# Patient Record
Sex: MALE | Race: WHITE | NOT HISPANIC OR LATINO | Employment: UNEMPLOYED | URBAN - NONMETROPOLITAN AREA
[De-identification: names, ages, dates, MRNs, and addresses within clinical notes are randomized per-mention and may not be internally consistent; named-entity substitution may affect disease eponyms.]

---

## 2023-10-22 PROBLEM — R06.00 DYSPNEA: Status: ACTIVE | Noted: 2023-10-22

## 2023-10-22 PROBLEM — J45.909 ASTHMA (HHS-HCC): Status: ACTIVE | Noted: 2023-10-22

## 2023-10-22 RX ORDER — ALBUTEROL SULFATE 0.83 MG/ML
SOLUTION RESPIRATORY (INHALATION)
COMMUNITY
End: 2024-01-31 | Stop reason: SDUPTHER

## 2023-10-22 RX ORDER — BUDESONIDE AND FORMOTEROL FUMARATE DIHYDRATE 160; 4.5 UG/1; UG/1
2 AEROSOL RESPIRATORY (INHALATION)
COMMUNITY

## 2023-10-24 ENCOUNTER — OFFICE VISIT (OUTPATIENT)
Dept: PULMONOLOGY | Facility: CLINIC | Age: 63
End: 2023-10-24
Payer: COMMERCIAL

## 2023-10-24 VITALS
HEART RATE: 67 BPM | DIASTOLIC BLOOD PRESSURE: 78 MMHG | OXYGEN SATURATION: 94 % | SYSTOLIC BLOOD PRESSURE: 130 MMHG | WEIGHT: 145 LBS | BODY MASS INDEX: 18.03 KG/M2 | HEIGHT: 75 IN

## 2023-10-24 DIAGNOSIS — R06.00 DYSPNEA, UNSPECIFIED TYPE: Primary | ICD-10-CM

## 2023-10-24 DIAGNOSIS — J45.909 ASTHMA, UNSPECIFIED ASTHMA SEVERITY, UNSPECIFIED WHETHER COMPLICATED, UNSPECIFIED WHETHER PERSISTENT (HHS-HCC): ICD-10-CM

## 2023-10-24 PROCEDURE — 99215 OFFICE O/P EST HI 40 MIN: CPT | Performed by: PEDIATRICS

## 2023-10-24 PROCEDURE — 1036F TOBACCO NON-USER: CPT | Performed by: PEDIATRICS

## 2023-10-24 NOTE — PROGRESS NOTES
Subjective   Patient ID: Pablito ROBB is a 63 y.o. male who presents for asthma SOB    HPI    Mr Robb is about the same. He had a PFT done which shows very severe obstruction with bornchodialtor response. He did not desaturate with walking.  He is using breo and needs albuterol 1-2 times a day.         previous note 11/16/2022: Mr Robb is here for asthma/emphysema. 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 The doctors refused to give him anti-helmenthic treatment so he uses homeopathic remedies and ivette healing which cured him of the parasites. The parasites ate holes in his stomach and lungs which is how he got emphysema.  He was previously on dulera and albuterol, the changed to breo and has been using trelegy since October. He feels the breo worked better for him with less side effects and would like to go back to that.    He never smoked     Review of Systems    See scanned documents attached to this note for review of systems, and appropriate scales/scores for this visit.     Objective   Physical Exam    Assessment/Plan     62yr old with asthma and emphysema (found on CXR)     1 asthma: feels he does bretter on symbicort as he can adjust the dose to his needs for any given day  - continue symbicort  - continue albuterol as needed  - discussed pulmonary rehab with him, he may be interested at some point but has decided for now to use prayer and deep knee bends to see if that works for him.  - I instructed him to call if he decides he would like a referral to pulmonary rehab  - given the severity of his lung function (FEV1 33% predicted) it seems he could qualify for disability.  I suggested he seek legal help to get this started.  I also provided a copy of his PFT to help with this.    2. ?parasitic infection in past: doubtful but he does have a mild eosinophilia  - nothing to do at this point  but if GI or pulmonary symptoms flair up could consider evaluation    f/u 6 months

## 2024-01-31 DIAGNOSIS — J45.909 ASTHMA, UNSPECIFIED ASTHMA SEVERITY, UNSPECIFIED WHETHER COMPLICATED, UNSPECIFIED WHETHER PERSISTENT (HHS-HCC): Primary | ICD-10-CM

## 2024-01-31 RX ORDER — ALBUTEROL SULFATE 0.83 MG/ML
2.5 SOLUTION RESPIRATORY (INHALATION) EVERY 6 HOURS PRN
Qty: 180 ML | Refills: 5 | Status: SHIPPED | OUTPATIENT
Start: 2024-01-31

## 2024-04-30 ENCOUNTER — TELEPHONE (OUTPATIENT)
Dept: PULMONOLOGY | Facility: CLINIC | Age: 64
End: 2024-04-30
Payer: COMMERCIAL

## 2024-04-30 NOTE — TELEPHONE ENCOUNTER
Patient is requesting a letter for jury duty for 5/9 same day he has an appt with you. I explained to patient we dont do letters for that but I would let you know and get back with him and probably have to r/s his appt.

## 2024-05-09 ENCOUNTER — APPOINTMENT (OUTPATIENT)
Dept: PULMONOLOGY | Facility: CLINIC | Age: 64
End: 2024-05-09
Payer: COMMERCIAL

## 2024-07-09 DIAGNOSIS — J45.909 ASTHMA, UNSPECIFIED ASTHMA SEVERITY, UNSPECIFIED WHETHER COMPLICATED, UNSPECIFIED WHETHER PERSISTENT (HHS-HCC): Primary | ICD-10-CM

## 2024-07-10 RX ORDER — BUDESONIDE AND FORMOTEROL FUMARATE DIHYDRATE 160; 4.5 UG/1; UG/1
2 AEROSOL RESPIRATORY (INHALATION) 2 TIMES DAILY
Qty: 10.2 G | Refills: 0 | Status: SHIPPED | OUTPATIENT
Start: 2024-07-10

## 2024-07-10 RX ORDER — ALBUTEROL SULFATE 90 UG/1
AEROSOL, METERED RESPIRATORY (INHALATION)
Qty: 18 G | Refills: 0 | Status: SHIPPED | OUTPATIENT
Start: 2024-07-10

## 2024-07-10 NOTE — TELEPHONE ENCOUNTER
Pt called and wanted to know if you could write an rx for proair for him he used to get this from Dr Dailey but pt no longer sees . Pt lost his prescription drug card since turning to medicare    Thank you!

## 2024-08-05 ENCOUNTER — APPOINTMENT (OUTPATIENT)
Dept: PULMONOLOGY | Facility: CLINIC | Age: 64
End: 2024-08-05
Payer: COMMERCIAL

## 2024-08-20 DIAGNOSIS — J45.909 ASTHMA, UNSPECIFIED ASTHMA SEVERITY, UNSPECIFIED WHETHER COMPLICATED, UNSPECIFIED WHETHER PERSISTENT (HHS-HCC): ICD-10-CM

## 2024-08-20 RX ORDER — BUDESONIDE AND FORMOTEROL FUMARATE DIHYDRATE 160; 4.5 UG/1; UG/1
2 AEROSOL RESPIRATORY (INHALATION) 2 TIMES DAILY
Qty: 10.2 G | Refills: 0 | Status: SHIPPED | OUTPATIENT
Start: 2024-08-20

## 2024-08-23 ENCOUNTER — TELEPHONE (OUTPATIENT)
Dept: PULMONOLOGY | Facility: CLINIC | Age: 64
End: 2024-08-23
Payer: COMMERCIAL

## 2024-08-23 NOTE — TELEPHONE ENCOUNTER
I called and spoke with the patient to let him know that his symbicort is not on his formulary for his insurance I gave him the website from [employer] to check to see if either breo or advair is covered under his insurance and to call us Monday morning to let us know what one is covered and then Dr MONTERROSO can send the over to his local pharmacy

## 2024-08-24 ENCOUNTER — HOSPITAL ENCOUNTER (EMERGENCY)
Facility: HOSPITAL | Age: 64
Discharge: HOME | End: 2024-08-24
Payer: COMMERCIAL

## 2024-08-24 VITALS
HEIGHT: 73 IN | RESPIRATION RATE: 18 BRPM | SYSTOLIC BLOOD PRESSURE: 134 MMHG | OXYGEN SATURATION: 93 % | WEIGHT: 140 LBS | BODY MASS INDEX: 18.55 KG/M2 | HEART RATE: 99 BPM | TEMPERATURE: 97.5 F | DIASTOLIC BLOOD PRESSURE: 101 MMHG

## 2024-08-24 DIAGNOSIS — J44.9 CHRONIC OBSTRUCTIVE PULMONARY DISEASE, UNSPECIFIED COPD TYPE (MULTI): Primary | ICD-10-CM

## 2024-08-24 PROCEDURE — 99281 EMR DPT VST MAYX REQ PHY/QHP: CPT

## 2024-08-24 RX ORDER — FLUTICASONE FUROATE AND VILANTEROL 200; 25 UG/1; UG/1
1 POWDER RESPIRATORY (INHALATION) ONCE
Status: DISCONTINUED | OUTPATIENT
Start: 2024-08-24 | End: 2024-08-24 | Stop reason: HOSPADM

## 2024-08-24 ASSESSMENT — COLUMBIA-SUICIDE SEVERITY RATING SCALE - C-SSRS
2. HAVE YOU ACTUALLY HAD ANY THOUGHTS OF KILLING YOURSELF?: NO
1. IN THE PAST MONTH, HAVE YOU WISHED YOU WERE DEAD OR WISHED YOU COULD GO TO SLEEP AND NOT WAKE UP?: NO
6. HAVE YOU EVER DONE ANYTHING, STARTED TO DO ANYTHING, OR PREPARED TO DO ANYTHING TO END YOUR LIFE?: NO

## 2024-08-24 ASSESSMENT — PAIN - FUNCTIONAL ASSESSMENT: PAIN_FUNCTIONAL_ASSESSMENT: 0-10

## 2024-08-24 ASSESSMENT — PAIN SCALES - GENERAL: PAINLEVEL_OUTOF10: 0 - NO PAIN

## 2024-08-25 NOTE — DISCHARGE INSTRUCTIONS
You have refused any work up or treatment in the ER,  heart related symptoms, heart attack, blood clot or pneumonia can not be excluded, if decided to have complete work up treatment return to the ER immediately

## 2024-08-25 NOTE — ED TRIAGE NOTES
Patient states he uses symbicort for his breathing and today was supposed to  a refill and they sent it to the wrong pharmacy and now he can't get it because the;y are closed. He is hoping we can give him some here.

## 2024-08-25 NOTE — ED PROVIDER NOTES
HPI   Chief Complaint   Patient presents with    Med Refill     Patient states he uses symbicort for his breathing and today was supposed to  a refill and they sent it to the wrong pharmacy and now he can't get it because the;y are closed. He is hoping we can give him some here.        HPI  [facility] pharmacy closed here to get symbicort on Saturday night   Patient says he was supposed to  his prescription from pharmacy however apparently the pharmacy was closed with 2 send denies and he is out of due to the weekend and he decided come to ER for Symbicort as his insurance from the ER.  He denies any chest pain abdominal pain vomiting or diarrhea he does not know any workup done he denies any dizziness lightheaded palpitation or difficulty breathing.  He want to prevent any problem of breathing before he has onset of symptoms and wants to be using Symbicort.  However currently denies any chest pain or short of breath any fever chills cough nausea vomiting diarrhea pain and swelling legs.        Family history: Reviewed   Social history: Reviewed, denies substance abuse.    Review of system: 10 review of system obtained review of system In HPI otherwise negative     Patient History   No past medical history on file.  No past surgical history on file.  No family history on file.  Social History     Tobacco Use    Smoking status: Never    Smokeless tobacco: Never   Substance Use Topics    Alcohol use: Not on file    Drug use: Not on file       Physical Exam   ED Triage Vitals [08/24/24 1957]   Temperature Heart Rate Respirations BP   36.4 °C (97.5 °F) 99 18 (!) 134/101      Pulse Ox Temp Source Heart Rate Source Patient Position   (!) 93 % Temporal Monitor Sitting      BP Location FiO2 (%)     Left arm --       Physical Exam  Constitutional:       Appearance: Normal appearance.      Comments: Patient was awake and alert pleasant cooperative well-developed manage did not look toxic or in distress.   Noted to have no tachypnea hypoxemia history of respiratory distress pulse ox 93% room air but he was not having any tachypnea or respiratory distress.  On auscultation he has minimal expiratory wheezing but moving air well bilaterally heart regular rate and rhythm lungs auscultated no drooling or stridor noted throat clear airway intact no angioedema.  No drooling or stridor.  Heart regular rate and rhythm lungs auscultated abdomen soft positive bowel sound nontender no guarding rebound or rigidity.  Calf is nontender Homans' sign negative intact distal pulse intact sensation no rashes no petechiae no ecchymosis neuro examination normal no motor or sensory deficit cranial nerves II through XII gross intact.  Patient walking with steady gait.   HENT:      Head: Normocephalic and atraumatic.      Right Ear: External ear normal.      Left Ear: External ear normal.      Nose: Nose normal. No congestion or rhinorrhea.   Eyes:      Extraocular Movements: Extraocular movements intact.      Conjunctiva/sclera: Conjunctivae normal.      Pupils: Pupils are equal, round, and reactive to light.   Cardiovascular:      Rate and Rhythm: Normal rate and regular rhythm.      Pulses: Normal pulses.      Heart sounds: Normal heart sounds.   Pulmonary:      Effort: Pulmonary effort is normal.      Breath sounds: Normal breath sounds.   Abdominal:      General: Abdomen is flat. Bowel sounds are normal.      Palpations: Abdomen is soft.   Musculoskeletal:      Cervical back: Normal range of motion and neck supple.   Skin:     General: Skin is warm.      Capillary Refill: Capillary refill takes less than 2 seconds.   Neurological:      General: No focal deficit present.      Mental Status: He is alert and oriented to person, place, and time.   Psychiatric:         Mood and Affect: Mood normal.         Behavior: Behavior normal.           ED Course & MDM   Diagnoses as of 09/07/24 0225   Chronic obstructive pulmonary disease, unspecified  COPD type (Multi)   OUT OF [facility] pharmacy closed here to get symbicort on Saturday night   Patient says he was supposed to  his prescription from pharmacy however apparently the pharmacy was closed with 2 send denies and he is out of due to the weekend and he decided come to ER for Symbicort as his insurance from the ER.  He denies any chest pain abdominal pain vomiting or diarrhea he does not know any workup done he denies any dizziness lightheaded palpitation or difficulty breathing.  He want to prevent any problem of breathing before he has onset of symptoms and wants to be using Symbicort.  However currently denies any chest pain or short of breath any fever chills cough nausea vomiting diarrhea pain and sw          Patient was awake and alert pleasant cooperative well-developed manage did not look toxic or in distress.  Noted to have no tachypnea hypoxemia history of respiratory distress pulse ox 93% room air but he was not having any tachypnea or respiratory distress.  On auscultation he has minimal expiratory wheezing but moving air well bilaterally heart regular rate and rhythm lungs auscultated no drooling or stridor noted throat clear airway intact no angioedema.  No drooling or stridor.  Heart regular rate and rhythm lungs auscultated abdomen soft positive bowel sound nontender no guarding rebound or rigidity.  Calf is nontender Homans' sign negative intact distal pulse intact sensation no rashes no petechiae no ecchymosis neuro examination normal no motor or sensory deficit cranial nerves II through XII gross intact.  Patient walking with steady gait.    No data recorded                                 Medical Decision Making  Patient did not want any workup done I did order DuoNeb aerosol treatment patient did not want any breathing treatment or workup he understood risk and benefit well he was dispensed with the Symbicort from respiratory therapist and as well as albuterol inhaler.  Use  albuterol and Symbicort in the ER prior to discharge and the remaining Symbicort and albuterol inhaler with him for use over the weekend until he get his pharmacy open get prescription filled.  If decided to be admitted worsening symptoms return to ER he understood risk and benefit well he was fully awake and alert and did not want any workup done in discharged with close follow-up.      procedure  Procedures     Kavitha Barber MD  08/24/24 2007       Kavitha Barber MD  09/07/24 0233       Kavitha Barber MD  09/07/24 0233

## 2024-10-07 ENCOUNTER — APPOINTMENT (OUTPATIENT)
Dept: PULMONOLOGY | Facility: CLINIC | Age: 64
End: 2024-10-07

## 2024-10-07 ENCOUNTER — APPOINTMENT (OUTPATIENT)
Dept: PULMONOLOGY | Facility: CLINIC | Age: 64
End: 2024-10-07
Payer: COMMERCIAL

## 2024-10-31 DIAGNOSIS — J45.909 ASTHMA, UNSPECIFIED ASTHMA SEVERITY, UNSPECIFIED WHETHER COMPLICATED, UNSPECIFIED WHETHER PERSISTENT (HHS-HCC): ICD-10-CM

## 2024-10-31 RX ORDER — ALBUTEROL SULFATE 0.83 MG/ML
2.5 SOLUTION RESPIRATORY (INHALATION) EVERY 6 HOURS PRN
Qty: 180 ML | Refills: 5 | Status: SHIPPED | OUTPATIENT
Start: 2024-10-31

## 2024-10-31 RX ORDER — ALBUTEROL SULFATE 90 UG/1
INHALANT RESPIRATORY (INHALATION)
Qty: 8.5 G | Refills: 0 | Status: SHIPPED | OUTPATIENT
Start: 2024-10-31

## 2024-11-07 ENCOUNTER — TELEPHONE (OUTPATIENT)
Dept: PULMONOLOGY | Facility: CLINIC | Age: 64
End: 2024-11-07
Payer: COMMERCIAL

## 2024-11-07 DIAGNOSIS — J45.909 ASTHMA, UNSPECIFIED ASTHMA SEVERITY, UNSPECIFIED WHETHER COMPLICATED, UNSPECIFIED WHETHER PERSISTENT (HHS-HCC): Primary | ICD-10-CM

## 2024-11-07 RX ORDER — FLUTICASONE PROPIONATE AND SALMETEROL 250; 50 UG/1; UG/1
1 POWDER RESPIRATORY (INHALATION)
Qty: 60 EACH | Refills: 11 | Status: SHIPPED | OUTPATIENT
Start: 2024-11-07

## 2024-11-07 NOTE — TELEPHONE ENCOUNTER
Pt called this morning and wants to switch from generic symbicort to advair he is wanting to know if this is okay. [facility]    Thank you!

## 2024-11-13 ENCOUNTER — APPOINTMENT (OUTPATIENT)
Dept: PULMONOLOGY | Facility: CLINIC | Age: 64
End: 2024-11-13
Payer: COMMERCIAL

## 2024-11-13 VITALS
HEART RATE: 101 BPM | WEIGHT: 138.8 LBS | SYSTOLIC BLOOD PRESSURE: 112 MMHG | OXYGEN SATURATION: 92 % | DIASTOLIC BLOOD PRESSURE: 75 MMHG | BODY MASS INDEX: 18.31 KG/M2

## 2024-11-13 DIAGNOSIS — J45.909 ASTHMA, UNSPECIFIED ASTHMA SEVERITY, UNSPECIFIED WHETHER COMPLICATED, UNSPECIFIED WHETHER PERSISTENT (HHS-HCC): Primary | ICD-10-CM

## 2024-11-13 DIAGNOSIS — J44.9 CHRONIC OBSTRUCTIVE PULMONARY DISEASE, UNSPECIFIED COPD TYPE (MULTI): ICD-10-CM

## 2024-11-13 PROCEDURE — 1036F TOBACCO NON-USER: CPT | Performed by: PEDIATRICS

## 2024-11-13 PROCEDURE — 1160F RVW MEDS BY RX/DR IN RCRD: CPT | Performed by: PEDIATRICS

## 2024-11-13 PROCEDURE — 1159F MED LIST DOCD IN RCRD: CPT | Performed by: PEDIATRICS

## 2024-11-13 PROCEDURE — 99215 OFFICE O/P EST HI 40 MIN: CPT | Performed by: PEDIATRICS

## 2024-11-13 NOTE — PROGRESS NOTES
Subjective   Patient ID: Pablito ROBB is a 64 y.o. male who presents for asthma/COPD    HPI    11/13/2024:  Mr Robb has had increased waxing/waning difficulty with his breathing which he attributes to having less spiritual strength than usual.  He is using advair currently.  He did not tolerate symbicort due to feeling calcium leaching out of his bones and dulera caused more of this.   He currently has no coverage for medications but that should change in January.    Previous note 10/24/2023:  Mr Robb is about the same. He had a PFT done which shows very severe obstruction with bornchodialtor response. He did not desaturate with walking.  He is using breo and needs albuterol 1-2 times a day.         previous note 11/16/2022: Mr Robb is here for asthma/emphysema. 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 The doctors refused to give him anti-helmenthic treatment so he uses homeopathic remedies and ivette healing which cured him of the parasites. The parasites ate holes in his stomach and lungs which is how he got emphysema.  He was previously on dulera and albuterol, the changed to breo and has been using trelegy since October. He feels the breo worked better for him with less side effects and would like to go back to that.     He never smoked        Review of Systems    See scanned documents attached to this note for review of systems, and appropriate scales/scores for this visit.     Objective   Physical Exam  Constitutional:       Appearance: Normal appearance.   HENT:      Head: Normocephalic and atraumatic.      Mouth/Throat:      Pharynx: Oropharynx is clear.   Cardiovascular:      Rate and Rhythm: Normal rate and regular rhythm.      Pulses: Normal pulses.      Heart sounds: Normal heart sounds.   Pulmonary:      Effort: Pulmonary effort is normal.      Breath sounds: No wheezing, rhonchi or  rales.      Comments: Scattered wheezing  Abdominal:      General: Bowel sounds are normal.      Palpations: Abdomen is soft.   Musculoskeletal:         General: Normal range of motion.   Skin:     General: Skin is warm and dry.   Neurological:      General: No focal deficit present.      Mental Status: He is alert and oriented to person, place, and time.   Psychiatric:         Mood and Affect: Mood normal.             Assessment/Plan        64yr old with asthma and emphysema (found on CXR)      1 asthma: feels he does bretter on symbicort as he can adjust the dose to his needs for any given day  - continue symbicort  - continue albuterol as needed  - discussed pulmonary rehab with him, he may be interested at some point but has decided for now to use prayer and deep knee bends to see if that works for him.  - I instructed him to call if he decides he would like a referral to pulmonary rehab  - given the severity of his lung function (FEV1 33% predicted) it seems he could qualify for disability.  I suggested he seek legal help to get this started.  I also provided a copy of his PFT to help with this.     11/13/2024:  continue advair and albuterol as needed    2. ?parasitic infection in past: doubtful but he does have a mild eosinophilia  - nothing to do at this point but if GI or pulmonary symptoms flair up could consider evaluation     Follow up 1 year    Serg Mathews MD 11/13/24 9:07 AM

## 2025-08-02 ENCOUNTER — APPOINTMENT (OUTPATIENT)
Dept: CARDIOLOGY | Facility: HOSPITAL | Age: 65
End: 2025-08-02
Payer: MEDICARE

## 2025-08-02 ENCOUNTER — HOSPITAL ENCOUNTER (EMERGENCY)
Facility: HOSPITAL | Age: 65
Discharge: HOME | End: 2025-08-02
Attending: EMERGENCY MEDICINE
Payer: MEDICARE

## 2025-08-02 ENCOUNTER — APPOINTMENT (OUTPATIENT)
Dept: RADIOLOGY | Facility: HOSPITAL | Age: 65
End: 2025-08-02
Payer: MEDICARE

## 2025-08-02 VITALS
HEART RATE: 88 BPM | OXYGEN SATURATION: 90 % | SYSTOLIC BLOOD PRESSURE: 127 MMHG | RESPIRATION RATE: 21 BRPM | BODY MASS INDEX: 18.64 KG/M2 | WEIGHT: 140.65 LBS | DIASTOLIC BLOOD PRESSURE: 88 MMHG | HEIGHT: 73 IN | TEMPERATURE: 97.8 F

## 2025-08-02 DIAGNOSIS — I47.10 SVT (SUPRAVENTRICULAR TACHYCARDIA): Primary | ICD-10-CM

## 2025-08-02 DIAGNOSIS — E87.6 HYPOKALEMIA: ICD-10-CM

## 2025-08-02 DIAGNOSIS — J44.1 COPD EXACERBATION (MULTI): ICD-10-CM

## 2025-08-02 LAB
ALBUMIN SERPL BCP-MCNC: 4.9 G/DL (ref 3.4–5)
ALP SERPL-CCNC: 94 U/L (ref 33–136)
ALT SERPL W P-5'-P-CCNC: 17 U/L (ref 10–52)
ANION GAP BLDA CALCULATED.4IONS-SCNC: 12 MMO/L (ref 10–25)
ANION GAP SERPL CALC-SCNC: 16 MMOL/L (ref 10–20)
ARTERIAL PATENCY WRIST A: POSITIVE
AST SERPL W P-5'-P-CCNC: 23 U/L (ref 9–39)
BASE EXCESS BLDA CALC-SCNC: -2.5 MMOL/L (ref -2–3)
BASOPHILS # BLD AUTO: 0.15 X10*3/UL (ref 0–0.1)
BASOPHILS NFR BLD AUTO: 1 %
BILIRUB SERPL-MCNC: 0.6 MG/DL (ref 0–1.2)
BNP SERPL-MCNC: 38 PG/ML (ref 0–99)
BODY TEMPERATURE: ABNORMAL
BUN SERPL-MCNC: 25 MG/DL (ref 6–23)
CA-I BLDA-SCNC: 1.12 MMOL/L (ref 1.1–1.33)
CALCIUM SERPL-MCNC: 9.2 MG/DL (ref 8.6–10.3)
CARDIAC TROPONIN I PNL SERPL HS: 12 NG/L (ref 0–20)
CARDIAC TROPONIN I PNL SERPL HS: 5 NG/L (ref 0–20)
CHLORIDE BLDA-SCNC: 106 MMOL/L (ref 98–107)
CHLORIDE SERPL-SCNC: 103 MMOL/L (ref 98–107)
CO2 SERPL-SCNC: 25 MMOL/L (ref 21–32)
CREAT SERPL-MCNC: 1.03 MG/DL (ref 0.5–1.3)
D DIMER PPP FEU-MCNC: <215 NG/ML FEU
EGFRCR SERPLBLD CKD-EPI 2021: 80 ML/MIN/1.73M*2
EOSINOPHIL # BLD AUTO: 1.22 X10*3/UL (ref 0–0.7)
EOSINOPHIL NFR BLD AUTO: 8.2 %
ERYTHROCYTE [DISTWIDTH] IN BLOOD BY AUTOMATED COUNT: 12.2 % (ref 11.5–14.5)
GLUCOSE BLDA-MCNC: 135 MG/DL (ref 74–99)
GLUCOSE SERPL-MCNC: 135 MG/DL (ref 74–99)
HCO3 BLDA-SCNC: 22.5 MMOL/L (ref 22–26)
HCT VFR BLD AUTO: 51.4 % (ref 41–52)
HCT VFR BLD EST: 48 % (ref 41–52)
HGB BLD-MCNC: 17.1 G/DL (ref 13.5–17.5)
HGB BLDA-MCNC: 15.9 G/DL (ref 13.5–17.5)
IMM GRANULOCYTES # BLD AUTO: 0.04 X10*3/UL (ref 0–0.7)
IMM GRANULOCYTES NFR BLD AUTO: 0.3 % (ref 0–0.9)
INHALED O2 CONCENTRATION: 44 %
INR PPP: 1.1 (ref 0.9–1.1)
LACTATE BLDA-SCNC: 1.4 MMOL/L (ref 0.4–2)
LACTATE SERPL-SCNC: 1.4 MMOL/L (ref 0.4–2)
LACTATE SERPL-SCNC: 5.4 MMOL/L (ref 0.4–2)
LYMPHOCYTES # BLD AUTO: 5.71 X10*3/UL (ref 1.2–4.8)
LYMPHOCYTES NFR BLD AUTO: 38.5 %
MAGNESIUM SERPL-MCNC: 2.39 MG/DL (ref 1.6–2.4)
MCH RBC QN AUTO: 31.5 PG (ref 26–34)
MCHC RBC AUTO-ENTMCNC: 33.3 G/DL (ref 32–36)
MCV RBC AUTO: 95 FL (ref 80–100)
MONOCYTES # BLD AUTO: 1.51 X10*3/UL (ref 0.1–1)
MONOCYTES NFR BLD AUTO: 10.2 %
NEUTROPHILS # BLD AUTO: 6.2 X10*3/UL (ref 1.2–7.7)
NEUTROPHILS NFR BLD AUTO: 41.8 %
NRBC BLD-RTO: 0 /100 WBCS (ref 0–0)
OXYHGB MFR BLDA: 94.5 % (ref 94–98)
PCO2 BLDA: 39 MM HG (ref 38–42)
PH BLDA: 7.37 PH (ref 7.38–7.42)
PLATELET # BLD AUTO: 352 X10*3/UL (ref 150–450)
PO2 BLDA: 78 MM HG (ref 85–95)
POTASSIUM BLDA-SCNC: 3.8 MMOL/L (ref 3.5–5.3)
POTASSIUM SERPL-SCNC: 3.3 MMOL/L (ref 3.5–5.3)
PROT SERPL-MCNC: 7.9 G/DL (ref 6.4–8.2)
PROTHROMBIN TIME: 11.9 SECONDS (ref 9.8–12.4)
RBC # BLD AUTO: 5.42 X10*6/UL (ref 4.5–5.9)
SAO2 % BLDA: 97 % (ref 94–100)
SODIUM BLDA-SCNC: 137 MMOL/L (ref 136–145)
SODIUM SERPL-SCNC: 141 MMOL/L (ref 136–145)
SPECIMEN DRAWN FROM PATIENT: ABNORMAL
WBC # BLD AUTO: 14.8 X10*3/UL (ref 4.4–11.3)

## 2025-08-02 PROCEDURE — 99291 CRITICAL CARE FIRST HOUR: CPT | Mod: 25 | Performed by: EMERGENCY MEDICINE

## 2025-08-02 PROCEDURE — 85610 PROTHROMBIN TIME: CPT | Performed by: EMERGENCY MEDICINE

## 2025-08-02 PROCEDURE — 2500000002 HC RX 250 W HCPCS SELF ADMINISTERED DRUGS (ALT 637 FOR MEDICARE OP, ALT 636 FOR OP/ED): Performed by: EMERGENCY MEDICINE

## 2025-08-02 PROCEDURE — 71045 X-RAY EXAM CHEST 1 VIEW: CPT

## 2025-08-02 PROCEDURE — 36415 COLL VENOUS BLD VENIPUNCTURE: CPT | Performed by: EMERGENCY MEDICINE

## 2025-08-02 PROCEDURE — 2500000002 HC RX 250 W HCPCS SELF ADMINISTERED DRUGS (ALT 637 FOR MEDICARE OP, ALT 636 FOR OP/ED)

## 2025-08-02 PROCEDURE — 96360 HYDRATION IV INFUSION INIT: CPT

## 2025-08-02 PROCEDURE — 71045 X-RAY EXAM CHEST 1 VIEW: CPT | Mod: FOREIGN READ | Performed by: RADIOLOGY

## 2025-08-02 PROCEDURE — 94640 AIRWAY INHALATION TREATMENT: CPT

## 2025-08-02 PROCEDURE — 93005 ELECTROCARDIOGRAM TRACING: CPT

## 2025-08-02 PROCEDURE — 85018 HEMOGLOBIN: CPT | Mod: 59 | Performed by: EMERGENCY MEDICINE

## 2025-08-02 PROCEDURE — 85025 COMPLETE CBC W/AUTO DIFF WBC: CPT | Performed by: EMERGENCY MEDICINE

## 2025-08-02 PROCEDURE — 85379 FIBRIN DEGRADATION QUANT: CPT | Performed by: EMERGENCY MEDICINE

## 2025-08-02 PROCEDURE — 96361 HYDRATE IV INFUSION ADD-ON: CPT

## 2025-08-02 PROCEDURE — 83735 ASSAY OF MAGNESIUM: CPT | Performed by: EMERGENCY MEDICINE

## 2025-08-02 PROCEDURE — 2500000004 HC RX 250 GENERAL PHARMACY W/ HCPCS (ALT 636 FOR OP/ED): Performed by: EMERGENCY MEDICINE

## 2025-08-02 PROCEDURE — 99284 EMERGENCY DEPT VISIT MOD MDM: CPT | Mod: 25

## 2025-08-02 PROCEDURE — 36600 WITHDRAWAL OF ARTERIAL BLOOD: CPT

## 2025-08-02 PROCEDURE — 2500000004 HC RX 250 GENERAL PHARMACY W/ HCPCS (ALT 636 FOR OP/ED)

## 2025-08-02 PROCEDURE — 84484 ASSAY OF TROPONIN QUANT: CPT | Performed by: EMERGENCY MEDICINE

## 2025-08-02 PROCEDURE — 83605 ASSAY OF LACTIC ACID: CPT | Performed by: EMERGENCY MEDICINE

## 2025-08-02 PROCEDURE — 84132 ASSAY OF SERUM POTASSIUM: CPT | Mod: 59 | Performed by: EMERGENCY MEDICINE

## 2025-08-02 PROCEDURE — 83880 ASSAY OF NATRIURETIC PEPTIDE: CPT | Performed by: EMERGENCY MEDICINE

## 2025-08-02 RX ORDER — PREDNISONE 20 MG/1
60 TABLET ORAL ONCE
Status: COMPLETED | OUTPATIENT
Start: 2025-08-02 | End: 2025-08-02

## 2025-08-02 RX ORDER — SODIUM CHLORIDE 9 MG/ML
125 INJECTION, SOLUTION INTRAVENOUS CONTINUOUS
Status: DISCONTINUED | OUTPATIENT
Start: 2025-08-02 | End: 2025-08-02 | Stop reason: HOSPADM

## 2025-08-02 RX ORDER — ADENOSINE 3 MG/ML
12 INJECTION, SOLUTION INTRAVENOUS ONCE
Status: COMPLETED | OUTPATIENT
Start: 2025-08-02 | End: 2025-08-02

## 2025-08-02 RX ORDER — PREDNISONE 20 MG/1
TABLET ORAL
Status: COMPLETED
Start: 2025-08-02 | End: 2025-08-02

## 2025-08-02 RX ORDER — IPRATROPIUM BROMIDE AND ALBUTEROL SULFATE 2.5; .5 MG/3ML; MG/3ML
3 SOLUTION RESPIRATORY (INHALATION) ONCE
Status: COMPLETED | OUTPATIENT
Start: 2025-08-02 | End: 2025-08-02

## 2025-08-02 RX ORDER — PREDNISONE 20 MG/1
40 TABLET ORAL DAILY
Qty: 10 TABLET | Refills: 0 | Status: SHIPPED | OUTPATIENT
Start: 2025-08-02 | End: 2025-08-07

## 2025-08-02 RX ORDER — ADENOSINE 3 MG/ML
6 INJECTION, SOLUTION INTRAVENOUS ONCE
Status: COMPLETED | OUTPATIENT
Start: 2025-08-02 | End: 2025-08-02

## 2025-08-02 RX ORDER — ADENOSINE 3 MG/ML
INJECTION, SOLUTION INTRAVENOUS
Status: COMPLETED
Start: 2025-08-02 | End: 2025-08-02

## 2025-08-02 RX ORDER — POTASSIUM CHLORIDE 20 MEQ/1
40 TABLET, EXTENDED RELEASE ORAL ONCE
Status: COMPLETED | OUTPATIENT
Start: 2025-08-02 | End: 2025-08-02

## 2025-08-02 RX ORDER — IPRATROPIUM BROMIDE AND ALBUTEROL SULFATE 2.5; .5 MG/3ML; MG/3ML
SOLUTION RESPIRATORY (INHALATION)
Status: COMPLETED
Start: 2025-08-02 | End: 2025-08-02

## 2025-08-02 RX ADMIN — ADENOSINE 6 MG: 3 INJECTION, SOLUTION INTRAVENOUS at 04:59

## 2025-08-02 RX ADMIN — IPRATROPIUM BROMIDE AND ALBUTEROL SULFATE 3 ML: .5; 3 SOLUTION RESPIRATORY (INHALATION) at 10:10

## 2025-08-02 RX ADMIN — IPRATROPIUM BROMIDE AND ALBUTEROL SULFATE 3 ML: 2.5; .5 SOLUTION RESPIRATORY (INHALATION) at 10:10

## 2025-08-02 RX ADMIN — SODIUM CHLORIDE 500 ML: 0.9 INJECTION, SOLUTION INTRAVENOUS at 05:00

## 2025-08-02 RX ADMIN — ADENOSINE 12 MG: 3 INJECTION, SOLUTION INTRAVENOUS at 05:02

## 2025-08-02 RX ADMIN — SODIUM CHLORIDE 125 ML/HR: 9 INJECTION, SOLUTION INTRAVENOUS at 05:15

## 2025-08-02 RX ADMIN — ADENOSINE 6 MG: 3 INJECTION INTRAVENOUS at 04:59

## 2025-08-02 RX ADMIN — PREDNISONE 60 MG: 20 TABLET ORAL at 09:59

## 2025-08-02 RX ADMIN — POTASSIUM CHLORIDE 40 MEQ: 1500 TABLET, EXTENDED RELEASE ORAL at 09:59

## 2025-08-02 ASSESSMENT — PAIN SCALES - GENERAL: PAINLEVEL_OUTOF10: 0 - NO PAIN

## 2025-08-02 ASSESSMENT — PAIN - FUNCTIONAL ASSESSMENT: PAIN_FUNCTIONAL_ASSESSMENT: 0-10

## 2025-08-02 NOTE — ED PROVIDER NOTES
Department of Emergency Medicine   ED  Provider Note  Admit Date/RoomTime: 8/2/2025  4:36 AM  ED Room: TR12/TR12                  History of Present Illness:   Pablito ROBB is a 65 y.o. male presenting to the ED for shortness of breath, beginning yesterday morning.  Patient states he noticed that his heart rate shot up around 3 AM.  EMS reported possible A-fib with RVR.  Patient reports he has lockjaw and since having locked jaw he has some difficulties.  He admits to shortness of breath.  He denies chest pain.  He admits to palpitations.  He is noted to be in SVT on arrival.  The complaint has been persistent, moderate in severity, and worsened by nothing.  Patient denies any fever or chills.  He denies significant cough.  He admits to history of asthma and COPD.      Review of Systems:   Pertinent positives and review of systems as noted above.  Remaining 10 review of systems is negative or noncontributory to today's episode of care.        --------------------------------------------- PAST HISTORY ---------------------------------------------  Past Medical History:  has no past medical history on file.  Bipolar affective disorder, adjustment disorder, asthma, COPD, TMJ, persecutory delusional disorder, PTSD    Past Surgical History:  has no past surgical history on file.    Social History:  reports that he has never smoked. He has never used smokeless tobacco. He reports that he does not use drugs.    Family History: family history is not on file. Unless otherwise noted, family history is non contributory    Patient's Medications   New Prescriptions    PREDNISONE (DELTASONE) 20 MG TABLET    Take 2 tablets (40 mg) by mouth once daily for 5 days.   Previous Medications    ALBUTEROL 2.5 MG /3 ML (0.083 %) NEBULIZER SOLUTION    Take 3 mL (2.5 mg) by nebulization every 6 hours if needed for wheezing. Use 3 mL via nebulizer every 6 hours as needed.    ALBUTEROL 90 MCG/ACTUATION INHALER    INHALE TWO PUFFS BY MOUTH AS  INSTRUCTED EVERY 6 HOURS AS NEEDED    FLUTICASONE PROPION-SALMETEROL (ADVAIR DISKUS) 250-50 MCG/DOSE DISKUS INHALER    Inhale 1 puff 2 times a day. Rinse mouth with water after use to reduce aftertaste and incidence of candidiasis. Do not swallow.   Modified Medications    No medications on file   Discontinued Medications    No medications on file      The patient’s home medications have been reviewed.    Allergies: Nsaids (non-steroidal anti-inflammatory drug)    -------------------------------------------------- RESULTS -------------------------------------------------  All laboratory and radiology results have been personally reviewed by myself   LABS:  Labs Reviewed   CBC WITH AUTO DIFFERENTIAL - Abnormal       Result Value    WBC 14.8 (*)     nRBC 0.0      RBC 5.42      Hemoglobin 17.1      Hematocrit 51.4      MCV 95      MCH 31.5      MCHC 33.3      RDW 12.2      Platelets 352      Neutrophils % 41.8      Immature Granulocytes %, Automated 0.3      Lymphocytes % 38.5      Monocytes % 10.2      Eosinophils % 8.2      Basophils % 1.0      Neutrophils Absolute 6.20      Immature Granulocytes Absolute, Automated 0.04      Lymphocytes Absolute 5.71 (*)     Monocytes Absolute 1.51 (*)     Eosinophils Absolute 1.22 (*)     Basophils Absolute 0.15 (*)    COMPREHENSIVE METABOLIC PANEL - Abnormal    Glucose 135 (*)     Sodium 141      Potassium 3.3 (*)     Chloride 103      Bicarbonate 25      Anion Gap 16      Urea Nitrogen 25 (*)     Creatinine 1.03      eGFR 80      Calcium 9.2      Albumin 4.9      Alkaline Phosphatase 94      Total Protein 7.9      AST 23      Bilirubin, Total 0.6      ALT 17     LACTATE - Abnormal    Lactate 5.4 (*)     Narrative:     Venipuncture immediately after or during the administration of Metamizole may lead to falsely low results. Testing should be performed immediately prior to Metamizole dosing.   BLOOD GAS ARTERIAL FULL PANEL - Abnormal    POCT pH, Arterial 7.37 (*)     POCT pCO2,  Arterial 39      POCT pO2, Arterial 78 (*)     POCT SO2, Arterial 97      POCT Oxy Hemoglobin, Arterial 94.5      POCT Hematocrit Calculated, Arterial 48.0      POCT Sodium, Arterial 137      POCT Potassium, Arterial 3.8      POCT Chloride, Arterial 106      POCT Ionized Calcium, Arterial 1.12      POCT Glucose, Arterial 135 (*)     POCT Lactate, Arterial 1.4      POCT Base Excess, Arterial -2.5 (*)     POCT HCO3 Calculated, Arterial 22.5      POCT Hemoglobin, Arterial 15.9      POCT Anion Gap, Arterial 12      Patient Temperature        FiO2 44      Site of Arterial Puncture Radial Right      David's Test Positive     MAGNESIUM - Normal    Magnesium 2.39     PROTIME-INR - Normal    Protime 11.9      INR 1.1     B-TYPE NATRIURETIC PEPTIDE - Normal    BNP 38      Narrative:        <100 pg/mL - Heart failure unlikely  100-299 pg/mL - Intermediate probability of acute heart                  failure exacerbation. Correlate with clinical                  context and patient history.    >=300 pg/mL - Heart Failure likely. Correlate with clinical                  context and patient history.    BNP testing is performed using different testing methodology at [facility] than at other [facility]. Direct result comparisons should only be made within the same method.      D-DIMER, VTE EXCLUSION - Normal    D-Dimer, Quantitative VTE Exclusion <215      Narrative:     The VTE Exclusion D-Dimer assay is reported in ng/mL Fibrinogen Equivalent Units (FEU).    Per 's instructions for use, a value of less than 500 ng/mL (FEU) may help to exclude DVT or PE in outpatients when the assay is used with a clinical pretest probability assessment.(AEMR must utilize and document eCalc 'Wells Score Deep Vein Thrombosis Risk' for DVT exclusion only. Emergency Department should utilize  Guidelines for Emergency Department Use of the VTE Exclusion D-Dimer and Clinical Pretest probability assessment model for DVT  or PE exclusion.)   SERIAL TROPONIN-INITIAL - Normal    Troponin I, High Sensitivity 5      Narrative:     Less than 99th percentile of normal range cutoff-  Female and children under 18 years old <14 ng/L; Male <21 ng/L: Negative  Repeat testing should be performed if clinically indicated.     Female and children under 18 years old 14-50 ng/L; Male 21-50 ng/L:  Consistent with possible cardiac damage and possible increased clinical   risk. Serial measurements may help to assess extent of myocardial damage.     >50 ng/L: Consistent with cardiac damage, increased clinical risk and  myocardial infarction. Serial measurements may help assess extent of   myocardial damage.      NOTE: Children less than 1 year old may have higher baseline troponin   levels and results should be interpreted in conjunction with the overall   clinical context.     NOTE: Troponin I testing is performed using a different   testing methodology at [facility] than at other   [facility]. Direct result comparisons should only   be made within the same method.   SERIAL TROPONIN, 1 HOUR - Normal    Troponin I, High Sensitivity 12      Narrative:     Less than 99th percentile of normal range cutoff-  Female and children under 18 years old <14 ng/L; Male <21 ng/L: Negative  Repeat testing should be performed if clinically indicated.     Female and children under 18 years old 14-50 ng/L; Male 21-50 ng/L:  Consistent with possible cardiac damage and possible increased clinical   risk. Serial measurements may help to assess extent of myocardial damage.     >50 ng/L: Consistent with cardiac damage, increased clinical risk and  myocardial infarction. Serial measurements may help assess extent of   myocardial damage.      NOTE: Children less than 1 year old may have higher baseline troponin   levels and results should be interpreted in conjunction with the overall   clinical context.     NOTE: Troponin I testing is performed using a  "different   testing methodology at [facility] than at other   [facility]. Direct result comparisons should only   be made within the same method.   LACTATE - Normal    Lactate 1.4      Narrative:     Venipuncture immediately after or during the administration of Metamizole may lead to falsely low results. Testing should be performed immediately prior to Metamizole dosing.   TROPONIN SERIES- (INITIAL, 1 HR)    Narrative:     The following orders were created for panel order Troponin I Series, High Sensitivity (0, 1 HR).  Procedure                               Abnormality         Status                     ---------                               -----------         ------                     Troponin I, High Sensiti...[[id number]]  Normal              Final result               Troponin, High Sensitivi...[[id number]]  Normal              Final result                 Please view results for these tests on the individual orders.         RADIOLOGY:  Interpreted by Radiologist.  XR chest 1 view   Final Result   No definite acute cardiopulmonary disease.  Chronic changes of COPD.   Signed by Angelo Williamson          No results found for this or any previous visit (from the past 4464 hours).  ------------------------- NURSING NOTES AND VITALS REVIEWED ---------------------------   The nursing notes within the ED encounter and vital signs as below have been reviewed.   /88   Pulse 84   Temp 35.9 °C (96.6 °F) (Temporal)   Resp 20   Ht 1.854 m (6' 1\")   Wt 63.8 kg (140 lb 10.5 oz)   SpO2 (!) 90%   BMI 18.56 kg/m²   Oxygen Saturation Interpretation: Normal      ---------------------------------------------------PHYSICAL EXAM--------------------------------------  Physical Exam  Vitals and nursing note reviewed.   Constitutional:       General: He is in acute distress.      Appearance: He is ill-appearing. He is not toxic-appearing or diaphoretic.      Interventions: He is not intubated.     " Comments: Anxious and short of breath   HENT:      Head: Normocephalic and atraumatic.      Mouth/Throat:      Mouth: Mucous membranes are moist.      Pharynx: Oropharynx is clear. No pharyngeal swelling or oropharyngeal exudate.     Eyes:      Extraocular Movements: Extraocular movements intact.      Pupils: Pupils are equal, round, and reactive to light.     Neck:      Thyroid: No thyromegaly.      Vascular: No hepatojugular reflux or JVD.      Trachea: No tracheal deviation.     Cardiovascular:      Rate and Rhythm: Tachycardia present.      Pulses: Normal pulses.      Heart sounds: Normal heart sounds. No murmur heard.     No friction rub. No gallop.   Pulmonary:      Effort: Tachypnea, accessory muscle usage and respiratory distress present. No bradypnea. He is not intubated.      Breath sounds: No stridor. Decreased breath sounds and wheezing present. No rhonchi or rales.      Comments: Patient with diminished breath sounds.  Positive accessory muscles of respiration use.  Chest:      Chest wall: No mass, deformity, tenderness, crepitus or edema. There is no dullness to percussion.   Abdominal:      Palpations: Abdomen is soft. There is no hepatomegaly, splenomegaly or mass.      Tenderness: There is no abdominal tenderness. There is no guarding or rebound.     Musculoskeletal:         General: Normal range of motion.      Cervical back: Normal range of motion and neck supple.      Right lower leg: No tenderness. No edema.      Left lower leg: No tenderness. No edema.   Lymphadenopathy:      Cervical: No cervical adenopathy.     Skin:     General: Skin is warm and dry.      Capillary Refill: Capillary refill takes less than 2 seconds.      Coloration: Skin is not cyanotic or pale.      Findings: No ecchymosis, erythema or rash.      Nails: There is no clubbing.     Neurological:      General: No focal deficit present.      Mental Status: He is alert and oriented to person, place, and time.      Cranial Nerves:  No cranial nerve deficit.      Motor: No weakness.     Psychiatric:         Mood and Affect: Mood is anxious.            Critical Care    Performed by: Otto Lopez DO  Authorized by: Otto Lopez DO    Critical care provider statement:     Critical care time (minutes):  42    Critical care time was exclusive of:  Separately billable procedures and treating other patients and teaching time    Critical care was necessary to treat or prevent imminent or life-threatening deterioration of the following conditions:  Respiratory failure (Cardiac arrhythmia: SVT)    Critical care was time spent personally by me on the following activities:  Blood draw for specimens, development of treatment plan with patient or surrogate, evaluation of patient's response to treatment, examination of patient, obtaining history from patient or surrogate, ordering and performing treatments and interventions, ordering and review of laboratory studies, ordering and review of radiographic studies, pulse oximetry, re-evaluation of patient's condition and review of old charts  Comments:      Discussed with patient.  Discussion and direction of nursing.  Given IV adenosine first 6 mg, then 12 mg IV push.  Evaluation of laboratory findings and treatment of abnormalities.        ------------------------------ ED COURSE/MEDICAL DECISION MAKING----------------------    Medical Decision Making:   Patient was seen and evaluated by me on arrival.  Patient was in SVT.  Short of breath.  Adenosine 6 mg IV push did not resolve the arrhythmia.  Adenosine 12 mg IV push did resolve the arrhythmia.  Patient remained in normal sinus rhythm thereafter.  Patient does seem to have a COPD flare.    ED Course as of 08/02/25 1014   Sat Aug 02, 2025   0508 EKG at 4:39 AM interpreted by me.  Supraventricular tachycardia rate 186 bpm.  Axis normal.  He has rightward axis.  RSR prime in lead V1 consistent with incomplete right bundle branch block.  QRS is 110 ms.    ms.  He has ST-T abnormality consistent with rate related ischemia. [EC]   0509 Patient was given IV adenosine 6 mg without conversion.    The patient was given a second dose of IV adenosine 12 mg IV push with success. [EC]   0510 Repeat  interpreted by me shows sinus tachycardia rate 114 bpm.  Axis normal.   ms QRS 80 ms  ms.  There is a lot of baseline artifact. [EC]   0510 This EKG was repeated at 5:02 AM.  Sinus tachycardia rate 115 bpm.  Axis normal.   ms QRS 84 ms  ms.  No acute ST segment elevation or depression there are some nonspecific ST-T change in baseline artifact.  No STEMI [EC]   0945 White blood cell count 14.8  Hemoglobin 17.1, hematocrit 51.4, platelet count 352 [EC]   0946 Comprehensive metabolic panel revealed potassium mildly low at 3.3, electrolytes otherwise normal.  Glucose 135.  BUN 25, creatinine 1.03  LFTs are normal [EC]   0946 D-dimer less than 215 [EC]   0946 First troponin 5  Second troponin 12  BNP 38  Lactate normal at 1.4  Magnesium normal at 2.39 [EC]   0947 Chest x-ray  IMPRESSION:  No definite acute cardiopulmonary disease.  Chronic changes of COPD.   [EC]   0956 A repeat EKG at 0738 interpreted by me at 0745.  Sinus tachycardia rate 104 bpm.  Axis normal.   ms,  ms,  ms.  RSR prime in lead V1 consistent with incomplete right bundle branch block.  Biatrial enlargement consistent with pulmonary pattern.  There is no acute ST-T change.  No STEMI. [EC]   0957 On reassessment the patient is resting comfortably.  We took him off his oxygen.  He is saturating 92% on room air.  Heart rate is 85.  Respiratory rate is 22 and unlabored.  Blood pressure 103/92.  The patient states he is feeling better.  The patient will be discharged home in improved and stable condition. [EC]   0958 The patient is asked to continue his regular COPD medications at home.  We will start him on prednisone 60 mg orally here x 1 then 40 mg once a day for  5 days.  Patient's potassium will be replaced with 40 mEq of potassium here as it was minimally low at 3.3. [EC]   1000 The patient is asked to return if acutely worsening worrisome symptoms.  He verbalizes understanding of instructions. [EC]      ED Course User Index  [EC] Otto Lopez DO         Diagnoses as of 08/02/25 1014   SVT (supraventricular tachycardia)   COPD exacerbation (Multi)   Hypokalemia      Counseling:   The emergency provider has spoken with the patient and discussed today’s results, in addition to providing specific details for the plan of care and counseling regarding the diagnosis and prognosis.  Questions are answered at this time and they are agreeable with the plan.      --------------------------------- IMPRESSION AND DISPOSITION ---------------------------------        IMPRESSION  1. SVT (supraventricular tachycardia)    2. COPD exacerbation (Multi)    3. Hypokalemia        DISPOSITION  Disposition: Discharge to home  Patient condition is stable      Billing Provider Critical Care Time: 42 minutes     Otto Lopez DO  08/02/25 1014

## 2025-08-02 NOTE — DISCHARGE INSTRUCTIONS
Continue your regular medications/inhalers/nebulizers as directed.  Take prednisone once a day:  next dose tomorrow.  RETURN if acutely worse or new worrisome symptoms.  FOLLOW UP with your regular doctor THIS WEEK.

## 2025-08-02 NOTE — ED TRIAGE NOTES
Pt BIBA for shortness ofbreath he had all day yesterday. Pt started on 4L o2. Pt also noticed his heart rate shoot up at 3am, pt in a fib rvr for the squad. Pt has a hx of afib rvr and is exacerbated by his issues with lockjaw. PT placed on monitor and EKG obtained.

## 2025-08-03 ENCOUNTER — APPOINTMENT (OUTPATIENT)
Dept: RADIOLOGY | Facility: HOSPITAL | Age: 65
DRG: 191 | End: 2025-08-03
Payer: MEDICARE

## 2025-08-03 ENCOUNTER — HOSPITAL ENCOUNTER (INPATIENT)
Facility: HOSPITAL | Age: 65
DRG: 191 | End: 2025-08-03
Attending: EMERGENCY MEDICINE | Admitting: INTERNAL MEDICINE
Payer: MEDICARE

## 2025-08-03 ENCOUNTER — APPOINTMENT (OUTPATIENT)
Dept: CARDIOLOGY | Facility: HOSPITAL | Age: 65
DRG: 191 | End: 2025-08-03
Payer: MEDICARE

## 2025-08-03 DIAGNOSIS — R06.02 SHORTNESS OF BREATH: ICD-10-CM

## 2025-08-03 DIAGNOSIS — J44.1 CHRONIC OBSTRUCTIVE PULMONARY DISEASE WITH ACUTE EXACERBATION (MULTI): Primary | ICD-10-CM

## 2025-08-03 DIAGNOSIS — R09.02 HYPOXIA: ICD-10-CM

## 2025-08-03 DIAGNOSIS — I47.10 PAROXYSMAL SVT (SUPRAVENTRICULAR TACHYCARDIA): ICD-10-CM

## 2025-08-03 LAB
ALBUMIN SERPL BCP-MCNC: 4.3 G/DL (ref 3.4–5)
ALP SERPL-CCNC: 73 U/L (ref 33–136)
ALT SERPL W P-5'-P-CCNC: 17 U/L (ref 10–52)
ANION GAP BLDV CALCULATED.4IONS-SCNC: 10 MMOL/L (ref 10–25)
ANION GAP SERPL CALC-SCNC: 14 MMOL/L (ref 10–20)
AST SERPL W P-5'-P-CCNC: 31 U/L (ref 9–39)
BASE EXCESS BLDV CALC-SCNC: 0.6 MMOL/L (ref -2–3)
BASOPHILS # BLD AUTO: 0.05 X10*3/UL (ref 0–0.1)
BASOPHILS NFR BLD AUTO: 0.4 %
BILIRUB SERPL-MCNC: 0.6 MG/DL (ref 0–1.2)
BNP SERPL-MCNC: 36 PG/ML (ref 0–99)
BODY TEMPERATURE: ABNORMAL
BUN SERPL-MCNC: 24 MG/DL (ref 6–23)
CA-I BLDV-SCNC: 1.19 MMOL/L (ref 1.1–1.33)
CALCIUM SERPL-MCNC: 8.5 MG/DL (ref 8.6–10.3)
CARDIAC TROPONIN I PNL SERPL HS: 5 NG/L (ref 0–20)
CARDIAC TROPONIN I PNL SERPL HS: 5 NG/L (ref 0–20)
CHLORIDE BLDV-SCNC: 105 MMOL/L (ref 98–107)
CHLORIDE SERPL-SCNC: 106 MMOL/L (ref 98–107)
CO2 SERPL-SCNC: 22 MMOL/L (ref 21–32)
CREAT SERPL-MCNC: 0.89 MG/DL (ref 0.5–1.3)
EGFRCR SERPLBLD CKD-EPI 2021: >90 ML/MIN/1.73M*2
EOSINOPHIL # BLD AUTO: 0.37 X10*3/UL (ref 0–0.7)
EOSINOPHIL NFR BLD AUTO: 3 %
ERYTHROCYTE [DISTWIDTH] IN BLOOD BY AUTOMATED COUNT: 12.3 % (ref 11.5–14.5)
GLUCOSE BLDV-MCNC: 127 MG/DL (ref 74–99)
GLUCOSE SERPL-MCNC: 118 MG/DL (ref 74–99)
HCO3 BLDV-SCNC: 27.1 MMOL/L (ref 22–26)
HCT VFR BLD AUTO: 46.9 % (ref 41–52)
HCT VFR BLD EST: 49 % (ref 41–52)
HGB BLD-MCNC: 15.5 G/DL (ref 13.5–17.5)
HGB BLDV-MCNC: 16.4 G/DL (ref 13.5–17.5)
IMM GRANULOCYTES # BLD AUTO: 0.05 X10*3/UL (ref 0–0.7)
IMM GRANULOCYTES NFR BLD AUTO: 0.4 % (ref 0–0.9)
INHALED O2 CONCENTRATION: 21 %
LACTATE BLDV-SCNC: 1.8 MMOL/L (ref 0.4–2)
LYMPHOCYTES # BLD AUTO: 3.96 X10*3/UL (ref 1.2–4.8)
LYMPHOCYTES NFR BLD AUTO: 31.7 %
MCH RBC QN AUTO: 30.9 PG (ref 26–34)
MCHC RBC AUTO-ENTMCNC: 33 G/DL (ref 32–36)
MCV RBC AUTO: 94 FL (ref 80–100)
MONOCYTES # BLD AUTO: 1.27 X10*3/UL (ref 0.1–1)
MONOCYTES NFR BLD AUTO: 10.2 %
NEUTROPHILS # BLD AUTO: 6.78 X10*3/UL (ref 1.2–7.7)
NEUTROPHILS NFR BLD AUTO: 54.3 %
NRBC BLD-RTO: 0 /100 WBCS (ref 0–0)
OXYHGB MFR BLDV: 60.7 % (ref 45–75)
PCO2 BLDV: 49 MM HG (ref 41–51)
PH BLDV: 7.35 PH (ref 7.33–7.43)
PLATELET # BLD AUTO: 272 X10*3/UL (ref 150–450)
PO2 BLDV: 35 MM HG (ref 35–45)
POTASSIUM BLDV-SCNC: 4.1 MMOL/L (ref 3.5–5.3)
POTASSIUM SERPL-SCNC: 5 MMOL/L (ref 3.5–5.3)
PROT SERPL-MCNC: 7.2 G/DL (ref 6.4–8.2)
RBC # BLD AUTO: 5.01 X10*6/UL (ref 4.5–5.9)
SAO2 % BLDV: 62 % (ref 45–75)
SODIUM BLDV-SCNC: 138 MMOL/L (ref 136–145)
SODIUM SERPL-SCNC: 137 MMOL/L (ref 136–145)
WBC # BLD AUTO: 12.5 X10*3/UL (ref 4.4–11.3)

## 2025-08-03 PROCEDURE — 1200000002 HC GENERAL ROOM WITH TELEMETRY DAILY: Mod: IPSPLIT

## 2025-08-03 PROCEDURE — 93010 ELECTROCARDIOGRAM REPORT: CPT | Performed by: INTERNAL MEDICINE

## 2025-08-03 PROCEDURE — 85025 COMPLETE CBC W/AUTO DIFF WBC: CPT | Performed by: EMERGENCY MEDICINE

## 2025-08-03 PROCEDURE — 99223 1ST HOSP IP/OBS HIGH 75: CPT | Performed by: NURSE PRACTITIONER

## 2025-08-03 PROCEDURE — 2500000004 HC RX 250 GENERAL PHARMACY W/ HCPCS (ALT 636 FOR OP/ED): Performed by: EMERGENCY MEDICINE

## 2025-08-03 PROCEDURE — 2500000004 HC RX 250 GENERAL PHARMACY W/ HCPCS (ALT 636 FOR OP/ED)

## 2025-08-03 PROCEDURE — 2500000004 HC RX 250 GENERAL PHARMACY W/ HCPCS (ALT 636 FOR OP/ED): Mod: JZ,TB,IPSPLIT

## 2025-08-03 PROCEDURE — 2500000005 HC RX 250 GENERAL PHARMACY W/O HCPCS: Performed by: EMERGENCY MEDICINE

## 2025-08-03 PROCEDURE — 2500000002 HC RX 250 W HCPCS SELF ADMINISTERED DRUGS (ALT 637 FOR MEDICARE OP, ALT 636 FOR OP/ED): Mod: IPSPLIT | Performed by: NURSE PRACTITIONER

## 2025-08-03 PROCEDURE — 83880 ASSAY OF NATRIURETIC PEPTIDE: CPT | Performed by: EMERGENCY MEDICINE

## 2025-08-03 PROCEDURE — 96361 HYDRATE IV INFUSION ADD-ON: CPT

## 2025-08-03 PROCEDURE — 84484 ASSAY OF TROPONIN QUANT: CPT | Performed by: EMERGENCY MEDICINE

## 2025-08-03 PROCEDURE — 94760 N-INVAS EAR/PLS OXIMETRY 1: CPT | Mod: IPSPLIT

## 2025-08-03 PROCEDURE — 2500000001 HC RX 250 WO HCPCS SELF ADMINISTERED DRUGS (ALT 637 FOR MEDICARE OP)

## 2025-08-03 PROCEDURE — 94640 AIRWAY INHALATION TREATMENT: CPT | Mod: IPSPLIT

## 2025-08-03 PROCEDURE — 36415 COLL VENOUS BLD VENIPUNCTURE: CPT | Performed by: EMERGENCY MEDICINE

## 2025-08-03 PROCEDURE — 84132 ASSAY OF SERUM POTASSIUM: CPT | Performed by: EMERGENCY MEDICINE

## 2025-08-03 PROCEDURE — 71046 X-RAY EXAM CHEST 2 VIEWS: CPT | Performed by: RADIOLOGY

## 2025-08-03 PROCEDURE — 96375 TX/PRO/DX INJ NEW DRUG ADDON: CPT

## 2025-08-03 PROCEDURE — 93005 ELECTROCARDIOGRAM TRACING: CPT

## 2025-08-03 PROCEDURE — 80053 COMPREHEN METABOLIC PANEL: CPT | Performed by: EMERGENCY MEDICINE

## 2025-08-03 PROCEDURE — 99291 CRITICAL CARE FIRST HOUR: CPT | Mod: 25 | Performed by: EMERGENCY MEDICINE

## 2025-08-03 PROCEDURE — 71046 X-RAY EXAM CHEST 2 VIEWS: CPT

## 2025-08-03 PROCEDURE — 2500000002 HC RX 250 W HCPCS SELF ADMINISTERED DRUGS (ALT 637 FOR MEDICARE OP, ALT 636 FOR OP/ED): Mod: IPSPLIT

## 2025-08-03 PROCEDURE — 96365 THER/PROPH/DIAG IV INF INIT: CPT

## 2025-08-03 RX ORDER — TALC
3 POWDER (GRAM) TOPICAL NIGHTLY PRN
Status: DISCONTINUED | OUTPATIENT
Start: 2025-08-03 | End: 2025-08-04 | Stop reason: HOSPADM

## 2025-08-03 RX ORDER — ACETAMINOPHEN 160 MG/5ML
650 SOLUTION ORAL EVERY 4 HOURS PRN
Status: DISCONTINUED | OUTPATIENT
Start: 2025-08-03 | End: 2025-08-03

## 2025-08-03 RX ORDER — ACETAMINOPHEN 325 MG/1
650 TABLET ORAL EVERY 4 HOURS PRN
Status: DISCONTINUED | OUTPATIENT
Start: 2025-08-03 | End: 2025-08-04 | Stop reason: HOSPADM

## 2025-08-03 RX ORDER — METOPROLOL TARTRATE 1 MG/ML
5 INJECTION, SOLUTION INTRAVENOUS
Status: DISCONTINUED | OUTPATIENT
Start: 2025-08-03 | End: 2025-08-03

## 2025-08-03 RX ORDER — POLYETHYLENE GLYCOL 3350 17 G/17G
17 POWDER, FOR SOLUTION ORAL DAILY PRN
Status: DISCONTINUED | OUTPATIENT
Start: 2025-08-03 | End: 2025-08-04 | Stop reason: HOSPADM

## 2025-08-03 RX ORDER — ENOXAPARIN SODIUM 100 MG/ML
40 INJECTION SUBCUTANEOUS EVERY 24 HOURS
Status: DISCONTINUED | OUTPATIENT
Start: 2025-08-03 | End: 2025-08-04

## 2025-08-03 RX ORDER — AZITHROMYCIN 250 MG/1
500 TABLET, FILM COATED ORAL DAILY
Status: DISCONTINUED | OUTPATIENT
Start: 2025-08-03 | End: 2025-08-04

## 2025-08-03 RX ORDER — ONDANSETRON HYDROCHLORIDE 2 MG/ML
4 INJECTION, SOLUTION INTRAVENOUS EVERY 8 HOURS PRN
Status: DISCONTINUED | OUTPATIENT
Start: 2025-08-03 | End: 2025-08-04 | Stop reason: HOSPADM

## 2025-08-03 RX ORDER — METOPROLOL TARTRATE 50 MG/1
TABLET ORAL
Status: COMPLETED
Start: 2025-08-03 | End: 2025-08-03

## 2025-08-03 RX ORDER — METOPROLOL TARTRATE 1 MG/ML
5 INJECTION, SOLUTION INTRAVENOUS ONCE
Status: COMPLETED | OUTPATIENT
Start: 2025-08-03 | End: 2025-08-03

## 2025-08-03 RX ORDER — METOPROLOL TARTRATE 50 MG/1
25 TABLET ORAL ONCE
Status: COMPLETED | OUTPATIENT
Start: 2025-08-03 | End: 2025-08-03

## 2025-08-03 RX ORDER — IPRATROPIUM BROMIDE AND ALBUTEROL SULFATE 2.5; .5 MG/3ML; MG/3ML
3 SOLUTION RESPIRATORY (INHALATION) 3 TIMES DAILY
Status: DISCONTINUED | OUTPATIENT
Start: 2025-08-03 | End: 2025-08-04 | Stop reason: HOSPADM

## 2025-08-03 RX ORDER — METOPROLOL TARTRATE 1 MG/ML
INJECTION, SOLUTION INTRAVENOUS
Status: COMPLETED
Start: 2025-08-03 | End: 2025-08-03

## 2025-08-03 RX ORDER — ONDANSETRON 4 MG/1
4 TABLET, FILM COATED ORAL EVERY 8 HOURS PRN
Status: DISCONTINUED | OUTPATIENT
Start: 2025-08-03 | End: 2025-08-04 | Stop reason: HOSPADM

## 2025-08-03 RX ORDER — IPRATROPIUM BROMIDE AND ALBUTEROL SULFATE 2.5; .5 MG/3ML; MG/3ML
3 SOLUTION RESPIRATORY (INHALATION)
Status: DISCONTINUED | OUTPATIENT
Start: 2025-08-03 | End: 2025-08-03

## 2025-08-03 RX ORDER — MAGNESIUM SULFATE HEPTAHYDRATE 40 MG/ML
2 INJECTION, SOLUTION INTRAVENOUS ONCE
Status: COMPLETED | OUTPATIENT
Start: 2025-08-03 | End: 2025-08-03

## 2025-08-03 RX ORDER — ACETAMINOPHEN 650 MG/1
650 SUPPOSITORY RECTAL EVERY 4 HOURS PRN
Status: DISCONTINUED | OUTPATIENT
Start: 2025-08-03 | End: 2025-08-03

## 2025-08-03 RX ORDER — IPRATROPIUM BROMIDE AND ALBUTEROL SULFATE 2.5; .5 MG/3ML; MG/3ML
SOLUTION RESPIRATORY (INHALATION)
Status: COMPLETED
Start: 2025-08-03 | End: 2025-08-03

## 2025-08-03 RX ORDER — ALBUTEROL SULFATE 0.83 MG/ML
2.5 SOLUTION RESPIRATORY (INHALATION) EVERY 6 HOURS PRN
Status: DISCONTINUED | OUTPATIENT
Start: 2025-08-03 | End: 2025-08-04 | Stop reason: HOSPADM

## 2025-08-03 RX ORDER — ENOXAPARIN SODIUM 100 MG/ML
40 INJECTION SUBCUTANEOUS EVERY 24 HOURS
Status: DISCONTINUED | OUTPATIENT
Start: 2025-08-03 | End: 2025-08-03 | Stop reason: SDUPTHER

## 2025-08-03 RX ADMIN — METOPROLOL TARTRATE 5 MG: 5 INJECTION INTRAVENOUS at 13:40

## 2025-08-03 RX ADMIN — MAGNESIUM SULFATE HEPTAHYDRATE 2 G: 40 INJECTION, SOLUTION INTRAVENOUS at 11:39

## 2025-08-03 RX ADMIN — Medication: at 21:41

## 2025-08-03 RX ADMIN — SODIUM CHLORIDE 1000 ML: 0.9 INJECTION, SOLUTION INTRAVENOUS at 11:19

## 2025-08-03 RX ADMIN — METOPROLOL TARTRATE 5 MG: 1 INJECTION, SOLUTION INTRAVENOUS at 13:40

## 2025-08-03 RX ADMIN — METHYLPREDNISOLONE SODIUM SUCCINATE 40 MG: 40 INJECTION, POWDER, FOR SOLUTION INTRAMUSCULAR; INTRAVENOUS at 16:30

## 2025-08-03 RX ADMIN — Medication: at 15:42

## 2025-08-03 RX ADMIN — METOPROLOL TARTRATE 25 MG: 50 TABLET ORAL at 13:40

## 2025-08-03 RX ADMIN — IPRATROPIUM BROMIDE AND ALBUTEROL SULFATE 3 ML: .5; 3 SOLUTION RESPIRATORY (INHALATION) at 21:41

## 2025-08-03 RX ADMIN — IPRATROPIUM BROMIDE AND ALBUTEROL SULFATE 3 ML: .5; 3 SOLUTION RESPIRATORY (INHALATION) at 15:38

## 2025-08-03 RX ADMIN — METOPROLOL TARTRATE 25 MG: 50 TABLET, FILM COATED ORAL at 13:40

## 2025-08-03 RX ADMIN — METHYLPREDNISOLONE SODIUM SUCCINATE 40 MG: 40 INJECTION, POWDER, FOR SOLUTION INTRAMUSCULAR; INTRAVENOUS at 22:03

## 2025-08-03 RX ADMIN — Medication 1 DOSE: at 11:43

## 2025-08-03 SDOH — SOCIAL STABILITY: SOCIAL INSECURITY: DO YOU FEEL ANYONE HAS EXPLOITED OR TAKEN ADVANTAGE OF YOU FINANCIALLY OR OF YOUR PERSONAL PROPERTY?: NO

## 2025-08-03 SDOH — SOCIAL STABILITY: SOCIAL INSECURITY: ARE YOU OR HAVE YOU BEEN THREATENED OR ABUSED PHYSICALLY, EMOTIONALLY, OR SEXUALLY BY ANYONE?: NO

## 2025-08-03 SDOH — SOCIAL STABILITY: SOCIAL INSECURITY: ABUSE: ADULT

## 2025-08-03 SDOH — SOCIAL STABILITY: SOCIAL INSECURITY: ARE THERE ANY APPARENT SIGNS OF INJURIES/BEHAVIORS THAT COULD BE RELATED TO ABUSE/NEGLECT?: NO

## 2025-08-03 SDOH — SOCIAL STABILITY: SOCIAL INSECURITY: HAVE YOU HAD THOUGHTS OF HARMING ANYONE ELSE?: YES

## 2025-08-03 SDOH — SOCIAL STABILITY: SOCIAL INSECURITY: DO YOU FEEL UNSAFE GOING BACK TO THE PLACE WHERE YOU ARE LIVING?: NO

## 2025-08-03 SDOH — ECONOMIC STABILITY: HOUSING INSECURITY: IN THE LAST 12 MONTHS, WAS THERE A TIME WHEN YOU WERE NOT ABLE TO PAY THE MORTGAGE OR RENT ON TIME?: NO

## 2025-08-03 SDOH — ECONOMIC STABILITY: FOOD INSECURITY: HOW HARD IS IT FOR YOU TO PAY FOR THE VERY BASICS LIKE FOOD, HOUSING, MEDICAL CARE, AND HEATING?: NOT HARD AT ALL

## 2025-08-03 SDOH — SOCIAL STABILITY: SOCIAL INSECURITY: WERE YOU ABLE TO COMPLETE ALL THE BEHAVIORAL HEALTH SCREENINGS?: YES

## 2025-08-03 SDOH — SOCIAL STABILITY: SOCIAL INSECURITY: HAS ANYONE EVER THREATENED TO HURT YOUR FAMILY OR YOUR PETS?: NO

## 2025-08-03 SDOH — SOCIAL STABILITY: SOCIAL INSECURITY: DOES ANYONE TRY TO KEEP YOU FROM HAVING/CONTACTING OTHER FRIENDS OR DOING THINGS OUTSIDE YOUR HOME?: NO

## 2025-08-03 ASSESSMENT — ACTIVITIES OF DAILY LIVING (ADL)
TOILETING: INDEPENDENT
HEARING - RIGHT EAR: FUNCTIONAL
JUDGMENT_ADEQUATE_SAFELY_COMPLETE_DAILY_ACTIVITIES: YES
LACK_OF_TRANSPORTATION: NO
FEEDING YOURSELF: INDEPENDENT
GROOMING: INDEPENDENT
DRESSING YOURSELF: INDEPENDENT
HEARING - LEFT EAR: FUNCTIONAL
WALKS IN HOME: INDEPENDENT
ADEQUATE_TO_COMPLETE_ADL: YES
PATIENT'S MEMORY ADEQUATE TO SAFELY COMPLETE DAILY ACTIVITIES?: YES
BATHING: INDEPENDENT

## 2025-08-03 ASSESSMENT — PATIENT HEALTH QUESTIONNAIRE - PHQ9
2. FEELING DOWN, DEPRESSED OR HOPELESS: NOT AT ALL
SUM OF ALL RESPONSES TO PHQ9 QUESTIONS 1 & 2: 0
1. LITTLE INTEREST OR PLEASURE IN DOING THINGS: NOT AT ALL

## 2025-08-03 ASSESSMENT — COGNITIVE AND FUNCTIONAL STATUS - GENERAL
PATIENT BASELINE BEDBOUND: NO
DAILY ACTIVITIY SCORE: 24
MOBILITY SCORE: 24

## 2025-08-03 ASSESSMENT — ENCOUNTER SYMPTOMS
SHORTNESS OF BREATH: 1
ACTIVITY CHANGE: 1
HEMATOLOGIC/LYMPHATIC NEGATIVE: 1
PSYCHIATRIC NEGATIVE: 1
CARDIOVASCULAR NEGATIVE: 1
GASTROINTESTINAL NEGATIVE: 1
MUSCULOSKELETAL NEGATIVE: 1
EYES NEGATIVE: 1
ENDOCRINE NEGATIVE: 1
NEUROLOGICAL NEGATIVE: 1
ALLERGIC/IMMUNOLOGIC NEGATIVE: 1
COUGH: 1

## 2025-08-03 ASSESSMENT — LIFESTYLE VARIABLES
HOW OFTEN DO YOU HAVE A DRINK CONTAINING ALCOHOL: NEVER
AUDIT-C TOTAL SCORE: 0
AUDIT-C TOTAL SCORE: 0
SKIP TO QUESTIONS 9-10: 1
HOW OFTEN DO YOU HAVE 6 OR MORE DRINKS ON ONE OCCASION: NEVER
HOW MANY STANDARD DRINKS CONTAINING ALCOHOL DO YOU HAVE ON A TYPICAL DAY: PATIENT DOES NOT DRINK

## 2025-08-03 ASSESSMENT — PAIN SCALES - GENERAL
PAINLEVEL_OUTOF10: 0 - NO PAIN
PAINLEVEL_OUTOF10: 0 - NO PAIN

## 2025-08-03 ASSESSMENT — PAIN - FUNCTIONAL ASSESSMENT: PAIN_FUNCTIONAL_ASSESSMENT: 0-10

## 2025-08-03 NOTE — ED TRIAGE NOTES
Pt states having hx COPD and emphysema, was seen in the ED yesterday and discharged home. Pt states this morning he started to feel SOB again when he woke up. Pt received 2 duonebs and 125mg IV solumedrol enroute

## 2025-08-03 NOTE — CARE PLAN
"The patient's goals for the shift include      The clinical goals for the shift include Pt will have a spo2 above 95% on 2L NC    Since arriving to the floor, patient has been calm and cooperative. He remains on 2L o2. Tolerated solumedrol and breathing treatments without adverse effects. Pt refuses any antibiotics as he states \"The spirits and God will heal him.\" NP aware.         "

## 2025-08-03 NOTE — H&P
History Of Present Illness  Pablito ROBB is a 65 y.o. male with past medical history of asthma, COPD, bipolar affective disorder, adjustment disorder, delusional disorder, PTSD who presented to the emergency department today with shortness of breath, cough and wheezing.  He states he was seen in the emergency department yesterday for SVT and COPD exacerbation and was discharged home.  He states since he was discharged home yesterday he has felt short of breath with exertion and improved with rest but then this morning he woke up and felt more short of breath.  On arrival to the ED he was given DuoNebs x 2 as well as IV Solu-Medrol and IV magnesium.  He required oxygen supplementation 2 L nasal cannula when he became mildly hypoxic. CT was ordered but patient refused it.  During his ED workup he experienced SVT with heart rates in the 180s on the monitor.  Before intervention could be carried out he converted back to sinus rhythm.  He was then given oral and IV metoprolol.  Lab workup showed mild leukocytosis, VBG unremarkable.  He was subsequently admitted to Medicine for further treatment and evaluation and close monitoring of hemodynamic status.     VS: T36.6, , respirations 24, /103, SpO2 93% on room air  EKG: Sinus tachycardia, rate 105, repeat  Significant labs: WBC 12.5,  Diagnostics: Chest x-ray: bronchial wall thickening suggestive of reactive airway disease, no consolidation     Past Medical History  Past Medical History:   Diagnosis Date    Adjustment disorder     Asthma     Bipolar affective (Multi)     COPD (chronic obstructive pulmonary disease) (Multi)     Delusional disorder, persecutory type (Multi)     Inguinal hernia     Pseudophakia of both eyes     PTSD (post-traumatic stress disorder)     TMJ (dislocation of temporomandibular joint)      Surgical History  Past Surgical History:   Procedure Laterality Date    CATARACT EXTRACTION Bilateral     HERNIA REPAIR      SINUS SURGERY         Social History  He reports that he has never smoked. He has never used smokeless tobacco. He reports that he does not use drugs. No history on file for alcohol use.    Family History  Family History[1]     Allergies  Nsaids (non-steroidal anti-inflammatory drug)    Review of Systems   Constitutional:  Positive for activity change.   HENT: Negative.     Eyes: Negative.    Respiratory:  Positive for cough and shortness of breath.    Cardiovascular: Negative.    Gastrointestinal: Negative.    Endocrine: Negative.    Genitourinary: Negative.    Musculoskeletal: Negative.    Allergic/Immunologic: Negative.    Neurological: Negative.    Hematological: Negative.    Psychiatric/Behavioral: Negative.          Physical Exam  Constitutional:       General: He is not in acute distress.     Appearance: Normal appearance. He is not toxic-appearing.   HENT:      Head: Normocephalic and atraumatic.      Mouth/Throat:      Mouth: Mucous membranes are moist.     Eyes:      Extraocular Movements: Extraocular movements intact.      Pupils: Pupils are equal, round, and reactive to light.       Cardiovascular:      Rate and Rhythm: Normal rate and regular rhythm.      Pulses: Normal pulses.      Heart sounds: Normal heart sounds. No murmur heard.     No gallop.   Pulmonary:      Effort: Pulmonary effort is normal. No respiratory distress.      Breath sounds: Wheezing present. No rhonchi or rales.   Abdominal:      General: Bowel sounds are normal. There is no distension.      Palpations: Abdomen is soft.      Tenderness: There is no abdominal tenderness. There is no guarding or rebound.     Musculoskeletal:         General: No swelling, tenderness, deformity or signs of injury. Normal range of motion.      Cervical back: Normal range of motion and neck supple.     Skin:     General: Skin is warm and dry.      Capillary Refill: Capillary refill takes less than 2 seconds.      Coloration: Skin is not jaundiced.      Findings: No bruising  "or rash.     Neurological:      General: No focal deficit present.      Mental Status: He is alert and oriented to person, place, and time. Mental status is at baseline.      Cranial Nerves: No cranial nerve deficit.      Sensory: No sensory deficit.      Motor: No weakness.      Gait: Gait normal.     Psychiatric:         Mood and Affect: Mood normal.         Behavior: Behavior normal.         Thought Content: Thought content normal.         Judgment: Judgment normal.          Last Recorded Vitals  Blood pressure (!) 140/91, pulse 87, temperature 36.6 °C (97.8 °F), resp. rate 16, height 1.854 m (6' 1\"), weight 65.2 kg (143 lb 11.8 oz), SpO2 98%.    Relevant Results  XR chest 2 views  Result Date: 8/3/2025  1. Mild diffuse increase in central perihilar markings and bronchial wall thickening suggestive of viral or reactive airways disease. No consolidations.     Signed by: Tariq Henry 8/3/2025 12:40 PM Dictation workstation:   [id number]    XR chest 1 view  Result Date: 8/2/2025  No definite acute cardiopulmonary disease.  Chronic changes of COPD. Signed by Angelo Williamson     Latest Reference Range & Units 08/03/25 11:04   GLUCOSE 74 - 99 mg/dL 118 (H)   SODIUM 136 - 145 mmol/L 137   POTASSIUM 3.5 - 5.3 mmol/L 5.0   CHLORIDE 98 - 107 mmol/L 106   Bicarbonate 21 - 32 mmol/L 22   Anion Gap 10 - 20 mmol/L 14   Blood Urea Nitrogen 6 - 23 mg/dL 24 (H)   Creatinine 0.50 - 1.30 mg/dL 0.89   EGFR >60 mL/min/1.73m*2 >90   Calcium 8.6 - 10.3 mg/dL 8.5 (L)   Albumin 3.4 - 5.0 g/dL 4.3   Alkaline Phosphatase 33 - 136 U/L 73   ALT 10 - 52 U/L 17   AST 9 - 39 U/L 31   Bilirubin Total 0.0 - 1.2 mg/dL 0.6   Total Protein 6.4 - 8.2 g/dL 7.2   BNP 0 - 99 pg/mL 36   Troponin I, High Sensitivity 0 - 20 ng/L 5   WBC 4.4 - 11.3 x10*3/uL 12.5 (H)   nRBC 0.0 - 0.0 /100 WBCs 0.0   RBC 4.50 - 5.90 x10*6/uL 5.01   HEMOGLOBIN 13.5 - 17.5 g/dL 15.5   HEMATOCRIT 41.0 - 52.0 % 46.9   MCV 80 - 100 fL 94   MCH 26.0 - 34.0 pg 30.9   MCHC 32.0 - 36.0 " g/dL 33.0   RED CELL DISTRIBUTION WIDTH 11.5 - 14.5 % 12.3   Platelets 150 - 450 x10*3/uL 272     Assessment & Plan  Chronic obstructive pulmonary disease with acute exacerbation (Multi)    SVT (supraventricular tachycardia)      Chronic obstructive pulmonary disease with acute exacerbation (Multi)  - Received IV magnesium, bronchodilators in ED  - WBC:12.5  - AB.35/49/35/27.1  - CXR: 1. Mild diffuse increase in central perihilar markings and bronchial wall thickening suggestive of viral or reactive airways disease. No consolidations.  - Currently on 2 liters  - RT consult, keep sats >90%  - wean as tolerated  - continue nebulized treatments  - continue steroids  - Antibiotics: refused  - bronchial hygiene    SVT (supraventricular tachycardia)  -hx of svt in the past  -converted back to SR without intervention  -telemetry  -IV metoprolol for HR>140  -Cardiology consult if needed    GI ppx: PPI  DVT ppx: enoxaparin  Fluids: prn  Electrolytes: replace as needed  Nutrition: reg  Adjuncts: PIV  Code Status: full  Pt requires inpatient stay at this time.    Angelina King, APRN-CNP         [1]   Family History  Problem Relation Name Age of Onset    Glaucoma Mother        department again.  On arrival to the emergency room he was given DuoNeb x 2 as well as IV Solu-Medrol and IV magnesium.  He was requiring 2 L of supplemental nasal cannula oxygen because of hypoxia.  CT scan was ordered but patient refused stated.  During his emergency department workup he was experiencing SVT with heart rate in the range of 180s.  It was converted back to normal rhythm before the intervention     Vital signs on arrival were temperature of 36.6, heart rate 105, respiration 24, blood pressure 152/103 and oxygen saturation of 93% on room air.  Diagnostics-chest x-ray is suggestive of bronchial wall thickening possibly reactive airway disease with no consolidation  Upon auscultation patient has diffuse bilateral expiratory wheezing.    He has been admitted to the hospital for COPD exacerbation and supraventricular tachycardia    -Patient has been placed on nebulizer, IV steroid.  He refused antibiotics.  - Cardiology has been consulted for SVT and he is getting IV metoprolol for the heart rate greater than 114 otherwise oral metoprolol.    DVT prophylaxis-enoxaparin    Dusty Chinchilla MD  Internal Medicine.       [1]   Family History  Problem Relation Name Age of Onset    Glaucoma Mother

## 2025-08-03 NOTE — SIGNIFICANT EVENT
Rt called bedside for patient with SOB. Patient 93% on RA. Patient states he took 1 albuterol tx at home. He received 2x duoneb in squad with steroids. Pt is diminished with expiratory wheezes. Patient being placed on cardiac monitoring at that time. Rt spoke with RN to call RT once the DR has accessed patient if he would like anymore breathing txs etc.

## 2025-08-03 NOTE — ED PROVIDER NOTES
HPI   Chief Complaint   Patient presents with    Shortness of Breath     Pt states having hx COPD and emphysema, was seen in the ED yesterday and discharged home. Pt states this morning he started to feel SOB again when he woke up. Pt received 2 duonebs and 125mg IV solumedrol enroute       65-year-old male with history of COPD, SVT, hyperlipidemia, TMJ dysfunction, inguinal hernias, bipolar disorder, per  he delusional disorder, PTSD, and reactive depression presents for evaluation of shortness of breath cough wheezing.  Patient was seen yesterday in this ED for SVT and COPD and discharged home.  He states since going home he has had shortness of breath with exertion that improves some with rest.  He called EMS this morning and was noted to be wheezing and short of breath.  He was given DuoNeb x 2 and Solu-Medrol 125 mg IV by EMS.  On arrival, patient has ongoing wheezing but states that shortness of breath is improving.  He states previously had been prescribed oxygen, but has no oxygen at home.      History provided by:  Patient, medical records and EMS personnel    Patient History   History reviewed. No pertinent past medical history.  Surgical History[1]  Family History[2]  Social History[3]    Physical Exam   ED Triage Vitals [08/03/25 1053]   Temperature Heart Rate Respirations BP   36.6 °C (97.8 °F) (!) 105 (!) 24 (!) 152/103      Pulse Ox Temp Source Heart Rate Source Patient Position   (!) 93 % Temporal -- --      BP Location FiO2 (%)     -- --       Physical Exam  Vitals and nursing note reviewed.   Constitutional:       Appearance: Normal appearance.   HENT:      Head: Normocephalic and atraumatic.      Right Ear: External ear normal.      Left Ear: External ear normal.      Nose: Nose normal.     Eyes:      Extraocular Movements: Extraocular movements intact.      Pupils: Pupils are equal, round, and reactive to light.       Cardiovascular:      Rate and Rhythm: Normal rate and regular rhythm.  "  Pulmonary:      Effort: Pulmonary effort is normal.      Breath sounds: Wheezing present.   Abdominal:      Palpations: Abdomen is soft.      Tenderness: There is no abdominal tenderness.     Musculoskeletal:         General: No deformity. Normal range of motion.      Cervical back: Normal range of motion.     Skin:     General: Skin is warm and dry.     Neurological:      General: No focal deficit present.      Mental Status: He is alert and oriented to person, place, and time.      Cranial Nerves: No cranial nerve deficit.      Sensory: No sensory deficit.      Motor: No weakness.     Psychiatric:         Mood and Affect: Mood normal.           ED Course & MDM   ED Course as of 08/03/25 1345   Sun Aug 03, 2025   1130 65-year-old male presents for evaluation of shortness of breath cough wheezing and hypoxia.  His oxygen saturation dropped to 88% on room air.  He was placed on 2 L nasal cannula oxygen with resolution of hypoxia.  He received 2 DuoNebs and Solu-Medrol prior to arrival.  He has residual wheezing without respiratory distress.  I ordered saline bolus, magnesium, oxygen.  Labs including serial troponins, BNP.  CT chest PE study ordered to evaluate for pulmonary embolism or consolidation.  Patient refused CT chest because \"I know I do not have a blood clot my lungs.\"  He did have a negative D-dimer on 8/2.  He is agreeable to chest x-ray and labs. [BT]   1139 ECG 12 lead  EKG interpreted by me performed at 10:53 AM on 8/3/2025 shows sinus tachycardia with rate of 105.  PVCs.  Normal axis.  No acute injury pattern. [BT]   1338 Patient went into SVT with heart rates in the 180s on the monitor.  He converted back to sinus rhythm without intervention.  Lopressor 5 mg IV and metoprolol 25 mg p.o. have been ordered. [BT]   1341 CBC with white count 12.5.  Venous blood gas unremarkable.  Chemistry with BUN 24 and calcium 8.5.  Otherwise, chemistry unremarkable.  Troponin 5.  EKG shows sinus tachycardia with no " acute injury pattern.  ACS doubtful.  Chest x-ray shows COPD.  No infiltrates.  He has remained hemodynamically stable in the emergency department.  Given new oxygen requirement, he will be hospitalized.  I spoke with medicine hospitalist MERYL Soriano who accepted patient for admission. [BT]   1344 Inpatient team updated on episode of SVT.  He will be admitted to the ICU for close monitoring. [BT]   1344 Electrocardiogram, 12-lead PRN ACS symptoms  Repeat EKG interpreted by me performed at 1334 on 8/3/2025 shows sinus rhythm with biatrial enlargement.  Rate = 81.  Right axis.  Pulmonary disease pattern.  No acute injury pattern. [BT]      ED Course User Index  [BT] Juancho Ozuna DO         Diagnoses as of 08/03/25 1345   Shortness of breath   Chronic obstructive pulmonary disease with acute exacerbation (Multi)   Hypoxia   Paroxysmal SVT (supraventricular tachycardia)     XR chest 2 views   Final Result   1. Mild diffuse increase in central perihilar markings and bronchial   wall thickening suggestive of viral or reactive airways disease. No   consolidations.             Signed by: Tariq Henry 8/3/2025 12:40 PM   Dictation workstation:   [id number]                      No data recorded                                 Medical Decision Making      Procedure  Critical Care    Performed by: Juancho Ozuna DO  Authorized by: Juancho Ozuna DO    Critical care provider statement:     Critical care time (minutes):  35    Critical care time was exclusive of:  Separately billable procedures and treating other patients and teaching time    Critical care was necessary to treat or prevent imminent or life-threatening deterioration of the following conditions:  Other (use comment box to enter another option) (Hypoxia requiring oxygen)    Critical care was time spent personally by me on the following activities:  Development of treatment plan with patient or surrogate, ordering and review of laboratory studies,  ordering and review of radiographic studies, pulse oximetry, re-evaluation of patient's condition, review of old charts, examination of patient, obtaining history from patient or surrogate and evaluation of patient's response to treatment    Care discussed with: admitting provider           [1] History reviewed. No pertinent surgical history.  [2] No family history on file.  [3]   Social History  Tobacco Use    Smoking status: Never    Smokeless tobacco: Never   Vaping Use    Vaping status: Never Used   Substance Use Topics    Alcohol use: Not on file    Drug use: Never        Juancho Ozuna,   08/03/25 [id number]

## 2025-08-04 ENCOUNTER — HOSPITAL ENCOUNTER (INPATIENT)
Dept: CARDIOLOGY | Facility: HOSPITAL | Age: 65
Discharge: HOME | DRG: 191 | End: 2025-08-04
Payer: MEDICARE

## 2025-08-04 VITALS
HEIGHT: 73 IN | RESPIRATION RATE: 27 BRPM | OXYGEN SATURATION: 96 % | HEART RATE: 100 BPM | BODY MASS INDEX: 18.99 KG/M2 | WEIGHT: 143.3 LBS | DIASTOLIC BLOOD PRESSURE: 66 MMHG | TEMPERATURE: 96.6 F | SYSTOLIC BLOOD PRESSURE: 109 MMHG

## 2025-08-04 VITALS
HEART RATE: 89 BPM | HEIGHT: 73 IN | SYSTOLIC BLOOD PRESSURE: 108 MMHG | TEMPERATURE: 98.1 F | BODY MASS INDEX: 19.02 KG/M2 | OXYGEN SATURATION: 94 % | RESPIRATION RATE: 14 BRPM | WEIGHT: 143.52 LBS | DIASTOLIC BLOOD PRESSURE: 73 MMHG

## 2025-08-04 LAB
ANION GAP SERPL CALC-SCNC: 11 MMOL/L (ref 10–20)
BUN SERPL-MCNC: 21 MG/DL (ref 6–23)
CALCIUM SERPL-MCNC: 8.4 MG/DL (ref 8.6–10.3)
CHLORIDE SERPL-SCNC: 108 MMOL/L (ref 98–107)
CO2 SERPL-SCNC: 25 MMOL/L (ref 21–32)
CREAT SERPL-MCNC: 0.79 MG/DL (ref 0.5–1.3)
D DIMER PPP FEU-MCNC: 346 NG/ML FEU
EGFRCR SERPLBLD CKD-EPI 2021: >90 ML/MIN/1.73M*2
ERYTHROCYTE [DISTWIDTH] IN BLOOD BY AUTOMATED COUNT: 12.4 % (ref 11.5–14.5)
GLUCOSE SERPL-MCNC: 137 MG/DL (ref 74–99)
HCT VFR BLD AUTO: 42.8 % (ref 41–52)
HGB BLD-MCNC: 14 G/DL (ref 13.5–17.5)
MCH RBC QN AUTO: 30.4 PG (ref 26–34)
MCHC RBC AUTO-ENTMCNC: 32.7 G/DL (ref 32–36)
MCV RBC AUTO: 93 FL (ref 80–100)
NRBC BLD-RTO: 0 /100 WBCS (ref 0–0)
PLATELET # BLD AUTO: 265 X10*3/UL (ref 150–450)
POTASSIUM SERPL-SCNC: 4.6 MMOL/L (ref 3.5–5.3)
RBC # BLD AUTO: 4.6 X10*6/UL (ref 4.5–5.9)
SODIUM SERPL-SCNC: 139 MMOL/L (ref 136–145)
WBC # BLD AUTO: 13.3 X10*3/UL (ref 4.4–11.3)

## 2025-08-04 PROCEDURE — 93005 ELECTROCARDIOGRAM TRACING: CPT | Mod: IPSPLIT

## 2025-08-04 PROCEDURE — 2500000004 HC RX 250 GENERAL PHARMACY W/ HCPCS (ALT 636 FOR OP/ED): Mod: JZ,TB,IPSPLIT

## 2025-08-04 PROCEDURE — 94640 AIRWAY INHALATION TREATMENT: CPT | Mod: IPSPLIT

## 2025-08-04 PROCEDURE — 85027 COMPLETE CBC AUTOMATED: CPT | Mod: IPSPLIT

## 2025-08-04 PROCEDURE — 85379 FIBRIN DEGRADATION QUANT: CPT | Mod: IPSPLIT

## 2025-08-04 PROCEDURE — 94761 N-INVAS EAR/PLS OXIMETRY MLT: CPT | Mod: IPSPLIT

## 2025-08-04 PROCEDURE — 94760 N-INVAS EAR/PLS OXIMETRY 1: CPT | Mod: IPSPLIT

## 2025-08-04 PROCEDURE — 2500000002 HC RX 250 W HCPCS SELF ADMINISTERED DRUGS (ALT 637 FOR MEDICARE OP, ALT 636 FOR OP/ED): Mod: IPSPLIT | Performed by: NURSE PRACTITIONER

## 2025-08-04 PROCEDURE — 36415 COLL VENOUS BLD VENIPUNCTURE: CPT | Mod: IPSPLIT

## 2025-08-04 PROCEDURE — 80048 BASIC METABOLIC PNL TOTAL CA: CPT | Mod: IPSPLIT

## 2025-08-04 PROCEDURE — 99239 HOSP IP/OBS DSCHRG MGMT >30: CPT | Performed by: NURSE PRACTITIONER

## 2025-08-04 RX ORDER — PREDNISONE 20 MG/1
40 TABLET ORAL DAILY
Status: DISCONTINUED | OUTPATIENT
Start: 2025-08-04 | End: 2025-08-04 | Stop reason: HOSPADM

## 2025-08-04 RX ORDER — SODIUM CHLORIDE 9 MG/ML
10 INJECTION, SOLUTION INTRAVENOUS CONTINUOUS PRN
Status: DISCONTINUED | OUTPATIENT
Start: 2025-08-04 | End: 2025-08-04 | Stop reason: HOSPADM

## 2025-08-04 RX ADMIN — IPRATROPIUM BROMIDE AND ALBUTEROL SULFATE 3 ML: .5; 3 SOLUTION RESPIRATORY (INHALATION) at 09:39

## 2025-08-04 RX ADMIN — METHYLPREDNISOLONE SODIUM SUCCINATE 40 MG: 40 INJECTION, POWDER, FOR SOLUTION INTRAMUSCULAR; INTRAVENOUS at 06:07

## 2025-08-04 SDOH — ECONOMIC STABILITY: HOUSING INSECURITY: AT ANY TIME IN THE PAST 12 MONTHS, WERE YOU HOMELESS OR LIVING IN A SHELTER (INCLUDING NOW)?: NO

## 2025-08-04 SDOH — ECONOMIC STABILITY: TRANSPORTATION INSECURITY: IN THE PAST 12 MONTHS, HAS LACK OF TRANSPORTATION KEPT YOU FROM MEDICAL APPOINTMENTS OR FROM GETTING MEDICATIONS?: NO

## 2025-08-04 SDOH — ECONOMIC STABILITY: HOUSING INSECURITY: IN THE LAST 12 MONTHS, WAS THERE A TIME WHEN YOU WERE NOT ABLE TO PAY THE MORTGAGE OR RENT ON TIME?: NO

## 2025-08-04 SDOH — ECONOMIC STABILITY: INCOME INSECURITY: IN THE PAST 12 MONTHS HAS THE ELECTRIC, GAS, OIL, OR WATER COMPANY THREATENED TO SHUT OFF SERVICES IN YOUR HOME?: NO

## 2025-08-04 SDOH — ECONOMIC STABILITY: FOOD INSECURITY: WITHIN THE PAST 12 MONTHS, YOU WORRIED THAT YOUR FOOD WOULD RUN OUT BEFORE YOU GOT THE MONEY TO BUY MORE.: NEVER TRUE

## 2025-08-04 SDOH — ECONOMIC STABILITY: FOOD INSECURITY: HOW HARD IS IT FOR YOU TO PAY FOR THE VERY BASICS LIKE FOOD, HOUSING, MEDICAL CARE, AND HEATING?: NOT HARD AT ALL

## 2025-08-04 SDOH — SOCIAL STABILITY: SOCIAL INSECURITY: WITHIN THE LAST YEAR, HAVE YOU BEEN AFRAID OF YOUR PARTNER OR EX-PARTNER?: NO

## 2025-08-04 SDOH — ECONOMIC STABILITY: FOOD INSECURITY: WITHIN THE PAST 12 MONTHS, THE FOOD YOU BOUGHT JUST DIDN'T LAST AND YOU DIDN'T HAVE MONEY TO GET MORE.: NEVER TRUE

## 2025-08-04 SDOH — SOCIAL STABILITY: SOCIAL INSECURITY
WITHIN THE LAST YEAR, HAVE YOU BEEN KICKED, HIT, SLAPPED, OR OTHERWISE PHYSICALLY HURT BY YOUR PARTNER OR EX-PARTNER?: NO

## 2025-08-04 SDOH — ECONOMIC STABILITY: HOUSING INSECURITY: IN THE PAST 12 MONTHS, HOW MANY TIMES HAVE YOU MOVED WHERE YOU WERE LIVING?: 1

## 2025-08-04 SDOH — SOCIAL STABILITY: SOCIAL INSECURITY: WITHIN THE LAST YEAR, HAVE YOU BEEN HUMILIATED OR EMOTIONALLY ABUSED IN OTHER WAYS BY YOUR PARTNER OR EX-PARTNER?: NO

## 2025-08-04 SDOH — SOCIAL STABILITY: SOCIAL INSECURITY
WITHIN THE LAST YEAR, HAVE YOU BEEN RAPED OR FORCED TO HAVE ANY KIND OF SEXUAL ACTIVITY BY YOUR PARTNER OR EX-PARTNER?: NO

## 2025-08-04 ASSESSMENT — ACTIVITIES OF DAILY LIVING (ADL)
LACK_OF_TRANSPORTATION: NO
LACK_OF_TRANSPORTATION: NO

## 2025-08-04 ASSESSMENT — PAIN - FUNCTIONAL ASSESSMENT
PAIN_FUNCTIONAL_ASSESSMENT: 0-10
PAIN_FUNCTIONAL_ASSESSMENT: 0-10

## 2025-08-04 ASSESSMENT — PAIN SCALES - GENERAL
PAINLEVEL_OUTOF10: 0 - NO PAIN

## 2025-08-04 NOTE — DISCHARGE INSTR - OTHER ORDERS
Thank you for choosing [facility] for your Health Care needs.  Also, thank you for allowing us to take you and your families preferences into account when determining your discharge plan.  Stay well!     You may receive a survey in the mail within the next couple weeks. Please take the time to complete it and return it. Your input is ALWAYS important to us. Thank you!  Your Care Transition Team - Carey Dominguez  & Isabela      For questions about your medications listed on your discharge instructions, please call the Nurses Station at [phone].

## 2025-08-04 NOTE — NURSING NOTE
0700: Assumed care of patient  0735: Angelina King CNP at bedside for rounds  0837: Patient refused Zithro, see mar   1128: see new order to discharge patient to home  1145: Patient reports he would feel more comfortable if he had oxygen at home. Angelina King CNP notified of same and placed new order for ambulatory study. Patient also states he will need the free shuttle service for ride home.   1300: Walking study in progress   1315: Informed by RT that patient does not qualify for O2, education provided to patient   1325: Free shuttle set up for this patient with an ETA of one hour, discharge instructions reviewed with patient who has no further questions or concerns   1433: Patient discharged to home at this time via free shuttle

## 2025-08-04 NOTE — DISCHARGE SUMMARY
"Discharge Diagnosis  Chronic obstructive pulmonary disease with acute exacerbation (Multi)       Discharge Meds     Medication List      CONTINUE taking these medications     * albuterol 90 mcg/actuation inhaler; INHALE TWO PUFFS BY MOUTH AS   INSTRUCTED EVERY 6 HOURS AS NEEDED   * albuterol 2.5 mg /3 mL (0.083 %) nebulizer solution; Take 3 mL (2.5   mg) by nebulization every 6 hours if needed for wheezing. Use 3 mL via   nebulizer every 6 hours as needed.   fluticasone propion-salmeteroL 250-50 mcg/dose diskus inhaler; Commonly   known as: Advair Diskus; Inhale 1 puff 2 times a day. Rinse mouth with   water after use to reduce aftertaste and incidence of candidiasis. Do not   swallow.   predniSONE 20 mg tablet; Commonly known as: Deltasone; Take 2 tablets   (40 mg) by mouth once daily for 5 days.  * This list has 2 medication(s) that are the same as other medications   prescribed for you. Read the directions carefully, and ask your doctor or   other care provider to review them with you.       Test Results Pending At Discharge  Pending Labs       No current pending labs.            Hospital Course   HPI: \"Pablito ROBB is a 65 y.o. male with past medical history of asthma, COPD, bipolar affective disorder, adjustment disorder, delusional disorder, PTSD who presented to the emergency department today with shortness of breath, cough and wheezing.  He states he was seen in the emergency department yesterday for SVT and COPD exacerbation and was discharged home.  He states since he was discharged home yesterday he has felt short of breath with exertion and improved with rest but then this morning he woke up and felt more short of breath.  On arrival to the ED he was given DuoNebs x 2 as well as IV Solu-Medrol and IV magnesium.  He required oxygen supplementation 2 L nasal cannula when he became mildly hypoxic. CT was ordered but patient refused it.  During his ED workup he experienced SVT with heart rates in the 180s " "on the monitor.  Before intervention could be carried out he converted back to sinus rhythm.  He was then given oral and IV metoprolol.  Lab workup showed mild leukocytosis, VBG unremarkable.  He was subsequently admitted to Medicine for further treatment and evaluation and close monitoring of hemodynamic status.\"    Pablito was admitted to Medicine and treated for COPD exacerbation.  He was treated with oxygen supplementation at 2 L nasal cannula, bronchodilators, steroids and bronchial hygiene.  He chose not to accept antibiotics for his illness.   Chronic medical conditions were also addressed and monitored. Labs were closely monitored.  He was discharged on room air after improvement in stable condition with the above medication changes and/or additions. Recommendations were made to follow up with pt's PCP in 1-2 weeks. See full inpatient plan below.     Problem list:  Chronic obstructive pulmonary disease with acute exacerbation (Multi)  - Received IV magnesium, bronchodilators in ED  - WBC:12.5  - AB.35/49/35/27.1  - CXR: 1. Mild diffuse increase in central perihilar markings and bronchial wall thickening suggestive of viral or reactive airways disease. No consolidations.  - Currently on 2 liters  - RT consult, keep sats >90%  - wean as tolerated  - continue nebulized treatments  - continue steroids  - Antibiotics: refused  - bronchial hygiene  --- Discharge home on above listed medications, follow-up as needed with PCP     SVT (supraventricular tachycardia)  -hx of svt in the past  -converted back to SR without intervention  -telemetry  -IV metoprolol for HR>140  -Cardiology consult if needed  --- Stable, follow-up as needed with PCP    Pertinent Physical Exam At Time of Discharge  Physical Exam  Constitutional:       General: He is not in acute distress.     Appearance: Normal appearance. He is not toxic-appearing.   HENT:      Head: Normocephalic and atraumatic.      Mouth/Throat:      Mouth: Mucous " membranes are moist.    Eyes:      Extraocular Movements: Extraocular movements intact.      Pupils: Pupils are equal, round, and reactive to light.    Cardiovascular:      Rate and Rhythm: Normal rate and regular rhythm.      Pulses: Normal pulses.      Heart sounds: Normal heart sounds. No murmur heard.     No gallop.   Pulmonary:      Effort: Pulmonary effort is normal. No respiratory distress.      Breath sounds: Wheezing present. No rhonchi or rales.   Abdominal:      General: Bowel sounds are normal. There is no distension.      Palpations: Abdomen is soft.      Tenderness: There is no abdominal tenderness. There is no guarding or rebound.    Musculoskeletal:         General: No swelling, tenderness, deformity or signs of injury. Normal range of motion.      Cervical back: Normal range of motion and neck supple.    Skin:     General: Skin is warm and dry.      Capillary Refill: Capillary refill takes less than 2 seconds.      Coloration: Skin is not jaundiced.      Findings: No bruising or rash.    Neurological:      General: No focal deficit present.      Mental Status: He is alert and oriented to person, place, and time. Mental status is at baseline.      Cranial Nerves: No cranial nerve deficit.      Sensory: No sensory deficit.      Motor: No weakness.      Gait: Gait normal.    Psychiatric:         Mood and Affect: Mood normal.         Behavior: Behavior normal.         Thought Content: Thought content normal.         Judgment: Judgment normal.       Patient seen by Dr Chinchilla on day of discharge.  Stable for discharge to home.  Total cumulative time spent in preparation of this discharge including documentation review, coordination of care with the medical team including PT/SW/care coordinators and treating consultants, discussion with patient and pertinent family members and finalization of prescriptions, follow-up appointments, and this discharge summary was approximately 45 minutes.    Outpatient  Follow-Up  Future Appointments   Date Time Provider Department Center   11/13/2025  1:00 PM Serg Mathews MD [id number]         Angelina King, YON-CNP

## 2025-08-04 NOTE — CARE PLAN
The patient's goals for the shift include  home oxygen   Problem: Pain - Adult  Goal: Verbalizes/displays adequate comfort level or baseline comfort level  Outcome: Met     Problem: Safety - Adult  Goal: Free from fall injury  Outcome: Met     Problem: Discharge Planning  Goal: Discharge to home or other facility with appropriate resources  Outcome: Met     Problem: Chronic Conditions and Co-morbidities  Goal: Patient's chronic conditions and co-morbidity symptoms are monitored and maintained or improved  Outcome: Met     Problem: Nutrition  Goal: Nutrient intake appropriate for maintaining nutritional needs  Outcome: Met       The clinical goals for the shift include patient will maintain an SpO2 above 93%  this shift    Per provider patient medically ready for discharge

## 2025-08-04 NOTE — PROGRESS NOTES
08/04/25 1011   Discharge Planning   Living Arrangements Alone   Support Systems [employer] community;Friends/neighbors   Assistance Needed None-patient alert and oriented x3; Patient says he lives in a 1 story house alone.  He is independent with ADLS, IADLS, ambulation and drives.  He does not use home oxygen/CPAP/BiPAP but does have inhaler and nebulizer.  He may need transportation home/shuttle.   Type of Residence Private residence   Number of Stairs to Enter Residence 2   Number of Stairs Within Residence 0   Do you have animals or pets at home? No   Home or Post Acute Services None   Expected Discharge Disposition Home   Does the patient need discharge transport arranged? No   Financial Resource Strain   How hard is it for you to pay for the very basics like food, housing, medical care, and heating? Not very   Housing Stability   In the last 12 months, was there a time when you were not able to pay the mortgage or rent on time? N   In the past 12 months, how many times have you moved where you were living? 1   At any time in the past 12 months, were you homeless or living in a shelter (including now)? N   Transportation Needs   In the past 12 months, has lack of transportation kept you from medical appointments or from getting medications? no   In the past 12 months, has lack of transportation kept you from meetings, work, or from getting things needed for daily living? No   Stroke Family Assessment   Stroke Family Assessment Needed No   Intensity of Service   Intensity of Service 0-30 min     Confirmed address and pharmacy.  PCP is Sahil Law.      DC PLAN:  Home

## 2025-08-04 NOTE — PROGRESS NOTES
Pharmacy Medication History Review    Pablito ROBB is a 65 y.o. male admitted for Chronic obstructive pulmonary disease with acute exacerbation (Multi). Pharmacy reviewed the patient's [id number] medications and allergies for accuracy.    The list below reflectives the updated PTA list. Please review each medication in order reconciliation for additional clarification and justification.  Medications Prior to Admission   Medication Sig Dispense Refill Last Dose/Taking    albuterol 2.5 mg /3 mL (0.083 %) nebulizer solution Take 3 mL (2.5 mg) by nebulization every 6 hours if needed for wheezing. Use 3 mL via nebulizer every 6 hours as needed. 180 mL 5 8/3/2025    albuterol 90 mcg/actuation inhaler INHALE TWO PUFFS BY MOUTH AS INSTRUCTED EVERY 6 HOURS AS NEEDED 8.5 g 0 8/3/2025    fluticasone propion-salmeteroL (Advair Diskus) 250-50 mcg/dose diskus inhaler Inhale 1 puff 2 times a day. Rinse mouth with water after use to reduce aftertaste and incidence of candidiasis. Do not swallow. 60 each 11 8/3/2025    predniSONE (Deltasone) 20 mg tablet Take 2 tablets (40 mg) by mouth once daily for 5 days. 10 tablet 0 8/2/2025        The list below reflectives the updated allergy list. Please review each documented allergy for additional clarification and justification.  Allergies  Reviewed by Zee Contreras RN on 8/4/2025        Severity Reactions Comments    Nsaids (non-steroidal Anti-inflammatory Drug) High GI bleeding             Below are additional concerns with the patient's PTA list.  Pharmacy virtually reviewed medications using Medication Dispense History (MD). Pharmacy virtual review is accurate with nursing medication history, therefore patient not interviewed. Unable to verify OTCs.    Michelle Moyer CPhT  Medication History Pharmacy Technician  [facility] 8-4:30  Available via [employer] Secure Chat  OR  [phone]

## 2025-08-04 NOTE — SIGNIFICANT EVENT
This RT walked with patient on RA. Patient tolerated well. SpO2 ranged from 90%-94%. Patient noted to have some dyspnea with tachycardia during walk, but SpO2 remained >90%. Patient stated that it was further than he usually walks at home. Patient requested home O2, due to his spiritual advisors suggestion. This RT informed patient that he can not obtain O2 for home without a prescription and he does not qualify at this time. Patient agreeable and states he will look into obtaining a concentrator from a private source.

## 2025-08-05 LAB
ATRIAL RATE: 104 BPM
ATRIAL RATE: 105 BPM
ATRIAL RATE: 115 BPM
ATRIAL RATE: 141 BPM
ATRIAL RATE: 81 BPM
P AXIS: 78 DEGREES
P AXIS: 80 DEGREES
P AXIS: 81 DEGREES
P AXIS: 81 DEGREES
P OFFSET: 178 MS
P OFFSET: 187 MS
P OFFSET: 190 MS
P OFFSET: 198 MS
P ONSET: 126 MS
P ONSET: 141 MS
P ONSET: 141 MS
P ONSET: 153 MS
PR INTERVAL: 148 MS
PR INTERVAL: 148 MS
PR INTERVAL: 154 MS
PR INTERVAL: 168 MS
Q ONSET: 200 MS
Q ONSET: 218 MS
Q ONSET: 220 MS
Q ONSET: 225 MS
Q ONSET: 227 MS
QRS COUNT: 13 BEATS
QRS COUNT: 17 BEATS
QRS COUNT: 18 BEATS
QRS COUNT: 19 BEATS
QRS COUNT: 31 BEATS
QRS DURATION: 100 MS
QRS DURATION: 104 MS
QRS DURATION: 110 MS
QRS DURATION: 110 MS
QRS DURATION: 84 MS
QT INTERVAL: 260 MS
QT INTERVAL: 314 MS
QT INTERVAL: 338 MS
QT INTERVAL: 362 MS
QT INTERVAL: 390 MS
QTC CALCULATION(BAZETT): 434 MS
QTC CALCULATION(BAZETT): 446 MS
QTC CALCULATION(BAZETT): 453 MS
QTC CALCULATION(BAZETT): 457 MS
QTC CALCULATION(BAZETT): 476 MS
QTC FREDERICIA: 379 MS
QTC FREDERICIA: 389 MS
QTC FREDERICIA: 407 MS
QTC FREDERICIA: 430 MS
QTC FREDERICIA: 435 MS
R AXIS: 93 DEGREES
R AXIS: 93 DEGREES
R AXIS: 96 DEGREES
R AXIS: 96 DEGREES
R AXIS: 97 DEGREES
T AXIS: -73 DEGREES
T AXIS: 50 DEGREES
T AXIS: 55 DEGREES
T AXIS: 74 DEGREES
T AXIS: 79 DEGREES
T OFFSET: 350 MS
T OFFSET: 381 MS
T OFFSET: 384 MS
T OFFSET: 387 MS
T OFFSET: 420 MS
VENTRICULAR RATE: 104 BPM
VENTRICULAR RATE: 105 BPM
VENTRICULAR RATE: 115 BPM
VENTRICULAR RATE: 186 BPM
VENTRICULAR RATE: 81 BPM

## 2025-08-18 LAB
ATRIAL RATE: 65 BPM
P AXIS: 95 DEGREES
P OFFSET: 179 MS
P ONSET: 125 MS
PR INTERVAL: 194 MS
Q ONSET: 222 MS
QRS COUNT: 10 BEATS
QRS DURATION: 100 MS
QT INTERVAL: 426 MS
QTC CALCULATION(BAZETT): 443 MS
QTC FREDERICIA: 437 MS
R AXIS: 87 DEGREES
T AXIS: 101 DEGREES
T OFFSET: 435 MS
VENTRICULAR RATE: 65 BPM

## 2025-08-27 DIAGNOSIS — J45.909 ASTHMA, UNSPECIFIED ASTHMA SEVERITY, UNSPECIFIED WHETHER COMPLICATED, UNSPECIFIED WHETHER PERSISTENT (HHS-HCC): ICD-10-CM

## 2025-08-28 RX ORDER — ALBUTEROL SULFATE 90 UG/1
INHALANT RESPIRATORY (INHALATION)
Qty: 8.5 G | Refills: 5 | Status: SHIPPED | OUTPATIENT
Start: 2025-08-28

## 2025-11-13 ENCOUNTER — APPOINTMENT (OUTPATIENT)
Dept: PULMONOLOGY | Facility: CLINIC | Age: 65
End: 2025-11-13
Payer: COMMERCIAL